# Patient Record
Sex: FEMALE | Race: WHITE | NOT HISPANIC OR LATINO | URBAN - METROPOLITAN AREA
[De-identification: names, ages, dates, MRNs, and addresses within clinical notes are randomized per-mention and may not be internally consistent; named-entity substitution may affect disease eponyms.]

---

## 2023-08-09 ENCOUNTER — HOSPITAL ENCOUNTER (OUTPATIENT)
Dept: DATA CONVERSION | Facility: HOSPITAL | Age: 68
Discharge: HOME | End: 2023-08-09

## 2023-08-09 DIAGNOSIS — E03.9 HYPOTHYROIDISM, UNSPECIFIED: ICD-10-CM

## 2023-08-09 DIAGNOSIS — E78.5 HYPERLIPIDEMIA, UNSPECIFIED: ICD-10-CM

## 2023-08-09 DIAGNOSIS — Z00.00 ENCOUNTER FOR GENERAL ADULT MEDICAL EXAMINATION WITHOUT ABNORMAL FINDINGS: ICD-10-CM

## 2023-08-09 DIAGNOSIS — I10 ESSENTIAL (PRIMARY) HYPERTENSION: ICD-10-CM

## 2023-08-09 LAB
ALBUMIN SERPL-MCNC: 4.5 GM/DL (ref 3.5–5)
ALBUMIN/GLOB SERPL: 1.7 RATIO (ref 1.5–3)
ALP BLD-CCNC: 85 U/L (ref 35–125)
ALT SERPL-CCNC: 38 U/L (ref 5–40)
ANION GAP SERPL CALCULATED.3IONS-SCNC: 11 MMOL/L (ref 0–19)
APPEARANCE PLAS: NORMAL
AST SERPL-CCNC: 28 U/L (ref 5–40)
BILIRUB SERPL-MCNC: 0.3 MG/DL (ref 0.1–1.2)
BUN SERPL-MCNC: 26 MG/DL (ref 8–25)
BUN/CREAT SERPL: 32.5 RATIO (ref 8–21)
CALCIUM SERPL-MCNC: 9.4 MG/DL (ref 8.5–10.4)
CHLORIDE SERPL-SCNC: 103 MMOL/L (ref 97–107)
CHOLEST SERPL-MCNC: 139 MG/DL (ref 133–200)
CHOLEST/HDLC SERPL: 2.6 RATIO
CO2 SERPL-SCNC: 27 MMOL/L (ref 24–31)
COLOR SPUN FLD: NORMAL
CREAT SERPL-MCNC: 0.8 MG/DL (ref 0.4–1.6)
DEPRECATED RDW RBC AUTO: 45.4 FL (ref 37–54)
ERYTHROCYTE [DISTWIDTH] IN BLOOD BY AUTOMATED COUNT: 13.2 % (ref 11.7–15)
FASTING STATUS PATIENT QL REPORTED: NORMAL
GFR SERPL CREATININE-BSD FRML MDRD: 80 ML/MIN/1.73 M2
GLOBULIN SER-MCNC: 2.7 G/DL (ref 1.9–3.7)
GLUCOSE SERPL-MCNC: 110 MG/DL (ref 65–99)
HCT VFR BLD AUTO: 36.6 % (ref 36–44)
HDLC SERPL-MCNC: 54 MG/DL
HGB BLD-MCNC: 12.3 GM/DL (ref 12–15)
LDLC SERPL CALC-MCNC: 68 MG/DL (ref 65–130)
MCH RBC QN AUTO: 31.6 PG (ref 26–34)
MCHC RBC AUTO-ENTMCNC: 33.6 % (ref 31–37)
MCV RBC AUTO: 94.1 FL (ref 80–100)
NRBC BLD-RTO: 0 /100 WBC
PLATELET # BLD AUTO: 237 K/UL (ref 150–450)
PMV BLD AUTO: 9.5 CU (ref 7–12.6)
POTASSIUM SERPL-SCNC: 4.5 MMOL/L (ref 3.4–5.1)
PROT SERPL-MCNC: 7.2 G/DL (ref 5.9–7.9)
RBC # BLD AUTO: 3.89 M/UL (ref 4–4.9)
SODIUM SERPL-SCNC: 141 MMOL/L (ref 133–145)
TRIGL SERPL-MCNC: 87 MG/DL (ref 40–150)
TSH SERPL DL<=0.05 MIU/L-ACNC: 1.43 MIU/L (ref 0.27–4.2)
WBC # BLD AUTO: 5.6 K/UL (ref 4.5–11)

## 2023-08-14 ENCOUNTER — HOSPITAL ENCOUNTER (OUTPATIENT)
Dept: DATA CONVERSION | Facility: HOSPITAL | Age: 68
Discharge: HOME | End: 2023-08-14

## 2023-08-14 DIAGNOSIS — Z12.31 ENCOUNTER FOR SCREENING MAMMOGRAM FOR MALIGNANT NEOPLASM OF BREAST: ICD-10-CM

## 2023-09-12 PROBLEM — R13.10 DYSPHAGIA: Status: ACTIVE | Noted: 2023-09-12

## 2023-09-12 PROBLEM — K21.9 GASTROESOPHAGEAL REFLUX DISEASE: Status: ACTIVE | Noted: 2023-09-12

## 2023-09-12 PROBLEM — E78.5 HYPERLIPIDEMIA: Status: ACTIVE | Noted: 2023-09-12

## 2023-09-12 PROBLEM — E78.5 DYSLIPIDEMIA: Status: ACTIVE | Noted: 2023-09-12

## 2023-09-12 PROBLEM — I35.8 AORTIC VALVE SCLEROSIS: Status: ACTIVE | Noted: 2023-09-12

## 2023-09-12 PROBLEM — E66.01 OBESITY, MORBID, BMI 40.0-49.9 (MULTI): Status: ACTIVE | Noted: 2023-09-12

## 2023-09-12 PROBLEM — M43.02 SPONDYLOLYSIS OF CERVICAL SPINE: Status: ACTIVE | Noted: 2023-09-12

## 2023-09-12 PROBLEM — I10 BENIGN ESSENTIAL HYPERTENSION: Status: ACTIVE | Noted: 2023-09-12

## 2023-09-12 PROBLEM — M79.7 FIBROMYALGIA: Status: ACTIVE | Noted: 2023-09-12

## 2023-09-12 PROBLEM — G47.19 EXCESSIVE DAYTIME SLEEPINESS: Status: ACTIVE | Noted: 2023-09-12

## 2023-09-12 PROBLEM — E03.9 HYPOTHYROIDISM: Status: ACTIVE | Noted: 2023-09-12

## 2023-09-12 PROBLEM — L71.9 ROSACEA: Status: ACTIVE | Noted: 2023-09-12

## 2023-09-12 PROBLEM — I10 HYPERTENSION: Status: ACTIVE | Noted: 2023-09-12

## 2023-09-12 PROBLEM — G47.33 OSA ON CPAP: Status: ACTIVE | Noted: 2023-09-12

## 2023-09-12 PROBLEM — Z99.89 CPAP (CONTINUOUS POSITIVE AIRWAY PRESSURE) DEPENDENCE: Status: ACTIVE | Noted: 2023-09-12

## 2023-09-12 PROBLEM — R26.9 GAIT DISTURBANCE: Status: ACTIVE | Noted: 2023-09-12

## 2023-09-12 PROBLEM — G57.10 MERALGIA PARESTHETICA: Status: ACTIVE | Noted: 2023-09-12

## 2023-09-12 RX ORDER — ATORVASTATIN CALCIUM 80 MG/1
80 TABLET, FILM COATED ORAL
COMMUNITY
Start: 2022-03-31

## 2023-09-12 RX ORDER — ATORVASTATIN CALCIUM 40 MG/1
40 TABLET, FILM COATED ORAL DAILY
COMMUNITY

## 2023-09-12 RX ORDER — LISINOPRIL AND HYDROCHLOROTHIAZIDE 20; 25 MG/1; MG/1
1 TABLET ORAL DAILY
COMMUNITY
End: 2023-10-18

## 2023-09-12 RX ORDER — THYROID 60 MG/1
60 TABLET ORAL DAILY
COMMUNITY
End: 2023-10-18

## 2023-09-12 RX ORDER — ETANERCEPT 50 MG/ML
50 SOLUTION SUBCUTANEOUS
COMMUNITY

## 2023-09-12 RX ORDER — GABAPENTIN 300 MG/1
600 CAPSULE ORAL 3 TIMES DAILY
COMMUNITY
End: 2023-12-27

## 2023-09-12 RX ORDER — OMEPRAZOLE 20 MG/1
20 CAPSULE, DELAYED RELEASE ORAL
COMMUNITY
Start: 2020-04-13 | End: 2023-12-07 | Stop reason: SDUPTHER

## 2023-09-12 RX ORDER — SULFASALAZINE 500 MG/1
1000 TABLET ORAL 2 TIMES DAILY
COMMUNITY

## 2023-09-12 RX ORDER — ASPIRIN 81 MG/1
81 TABLET ORAL DAILY
COMMUNITY

## 2023-09-12 RX ORDER — AMLODIPINE BESYLATE 5 MG/1
5 TABLET ORAL DAILY
COMMUNITY

## 2023-09-12 RX ORDER — OMEPRAZOLE 20 MG/1
20 TABLET, DELAYED RELEASE ORAL 2 TIMES DAILY
COMMUNITY

## 2023-09-12 RX ORDER — CYCLOBENZAPRINE HCL 10 MG
10 TABLET ORAL NIGHTLY PRN
COMMUNITY

## 2023-10-18 DIAGNOSIS — I10 PRIMARY HYPERTENSION: ICD-10-CM

## 2023-10-18 DIAGNOSIS — E03.8 OTHER SPECIFIED HYPOTHYROIDISM: ICD-10-CM

## 2023-10-18 RX ORDER — SULFAMETHOXAZOLE AND TRIMETHOPRIM 800; 160 MG/1; MG/1
60 TABLET ORAL DAILY
Qty: 90 TABLET | Refills: 3 | Status: SHIPPED | OUTPATIENT
Start: 2023-10-18 | End: 2024-02-07

## 2023-10-18 RX ORDER — LISINOPRIL AND HYDROCHLOROTHIAZIDE 20; 25 MG/1; MG/1
1 TABLET ORAL DAILY
Qty: 90 TABLET | Refills: 3 | Status: SHIPPED | OUTPATIENT
Start: 2023-10-18

## 2023-12-07 DIAGNOSIS — K21.9 GASTROESOPHAGEAL REFLUX DISEASE WITHOUT ESOPHAGITIS: Primary | ICD-10-CM

## 2023-12-07 RX ORDER — OMEPRAZOLE 20 MG/1
20 CAPSULE, DELAYED RELEASE ORAL
Qty: 90 CAPSULE | Refills: 3 | Status: SHIPPED | OUTPATIENT
Start: 2023-12-07 | End: 2024-12-06

## 2023-12-27 DIAGNOSIS — M43.02 SPONDYLOLYSIS OF CERVICAL SPINE: ICD-10-CM

## 2023-12-27 RX ORDER — GABAPENTIN 300 MG/1
CAPSULE ORAL
Qty: 210 CAPSULE | Refills: 3 | Status: SHIPPED | OUTPATIENT
Start: 2023-12-27 | End: 2024-02-07 | Stop reason: DRUGHIGH

## 2024-02-07 ENCOUNTER — TELEPHONE (OUTPATIENT)
Dept: PRIMARY CARE | Facility: CLINIC | Age: 69
End: 2024-02-07
Payer: COMMERCIAL

## 2024-02-07 DIAGNOSIS — E03.9 ACQUIRED HYPOTHYROIDISM: Primary | ICD-10-CM

## 2024-02-07 DIAGNOSIS — E03.9 ACQUIRED HYPOTHYROIDISM: ICD-10-CM

## 2024-02-07 DIAGNOSIS — Z01.89 ENCOUNTER FOR ROUTINE LABORATORY TESTING: ICD-10-CM

## 2024-02-07 DIAGNOSIS — G57.10 MERALGIA PARESTHETICA, UNSPECIFIED LATERALITY: ICD-10-CM

## 2024-02-07 RX ORDER — GABAPENTIN 300 MG/1
600 CAPSULE ORAL 3 TIMES DAILY
Qty: 180 CAPSULE | Refills: 11 | Status: SHIPPED | OUTPATIENT
Start: 2024-02-07

## 2024-02-07 RX ORDER — LEVOTHYROXINE SODIUM 100 UG/1
TABLET ORAL
Qty: 90 TABLET | Refills: 3 | Status: SHIPPED | OUTPATIENT
Start: 2024-02-07

## 2024-02-07 RX ORDER — LEVOTHYROXINE SODIUM 100 UG/1
100 TABLET ORAL DAILY
Qty: 30 TABLET | Refills: 11 | Status: SHIPPED | OUTPATIENT
Start: 2024-02-07 | End: 2024-02-07

## 2024-02-07 NOTE — TELEPHONE ENCOUNTER
Prescriptions escribed 30 days with r/f x1 year. Cocoa converted to Levothyroxine 100 mcg daily. Order for repeat TSH in 3 months placed to assure level is normalized.  Please advise patient.

## 2024-02-07 NOTE — TELEPHONE ENCOUNTER
Pt has new insurance needs new rx for Gabapentin 300mg currently takes 2 @ breakfast, 2 @ dinner and 3 @ bedtime = # 210, new insurance only covers # 180 per month, pt requests order to be changed to 2 @ bedtime.  Pt also states Verbena Thyroid will not be covered and is requesting a 30 day supply of a different thyroid medication to be called to [address] in [place].

## 2024-03-11 ENCOUNTER — OFFICE VISIT (OUTPATIENT)
Dept: SLEEP MEDICINE | Facility: CLINIC | Age: 69
End: 2024-03-11
Payer: MEDICARE

## 2024-03-11 VITALS
WEIGHT: 280 LBS | OXYGEN SATURATION: 97 % | SYSTOLIC BLOOD PRESSURE: 130 MMHG | HEIGHT: 65 IN | BODY MASS INDEX: 46.65 KG/M2 | HEART RATE: 74 BPM | DIASTOLIC BLOOD PRESSURE: 86 MMHG

## 2024-03-11 DIAGNOSIS — G47.33 OSA ON CPAP: Primary | ICD-10-CM

## 2024-03-11 PROCEDURE — 3075F SYST BP GE 130 - 139MM HG: CPT | Performed by: INTERNAL MEDICINE

## 2024-03-11 PROCEDURE — 1036F TOBACCO NON-USER: CPT | Performed by: INTERNAL MEDICINE

## 2024-03-11 PROCEDURE — 1126F AMNT PAIN NOTED NONE PRSNT: CPT | Performed by: INTERNAL MEDICINE

## 2024-03-11 PROCEDURE — 3079F DIAST BP 80-89 MM HG: CPT | Performed by: INTERNAL MEDICINE

## 2024-03-11 PROCEDURE — 1159F MED LIST DOCD IN RCRD: CPT | Performed by: INTERNAL MEDICINE

## 2024-03-11 PROCEDURE — 99213 OFFICE O/P EST LOW 20 MIN: CPT | Performed by: INTERNAL MEDICINE

## 2024-03-11 PROCEDURE — 1160F RVW MEDS BY RX/DR IN RCRD: CPT | Performed by: INTERNAL MEDICINE

## 2024-03-11 ASSESSMENT — SLEEP AND FATIGUE QUESTIONNAIRES
HOW LIKELY ARE YOU TO NOD OFF OR FALL ASLEEP WHILE WATCHING TV: HIGH CHANCE OF DOZING
SITING INACTIVE IN A PUBLIC PLACE LIKE A CLASS ROOM OR A MOVIE THEATER: WOULD NEVER DOZE
HOW LIKELY ARE YOU TO NOD OFF OR FALL ASLEEP WHILE SITTING AND TALKING TO SOMEONE: WOULD NEVER DOZE
HOW LIKELY ARE YOU TO NOD OFF OR FALL ASLEEP WHILE LYING DOWN TO REST IN THE AFTERNOON WHEN CIRCUMSTANCES PERMIT: WOULD NEVER DOZE
HOW LIKELY ARE YOU TO NOD OFF OR FALL ASLEEP WHILE SITTING AND READING: MODERATE CHANCE OF DOZING
HOW LIKELY ARE YOU TO NOD OFF OR FALL ASLEEP IN A CAR, WHILE STOPPED FOR A FEW MINUTES IN TRAFFIC: WOULD NEVER DOZE
ESS-CHAD TOTAL SCORE: 6
HOW LIKELY ARE YOU TO NOD OFF OR FALL ASLEEP WHEN YOU ARE A PASSENGER IN A CAR FOR AN HOUR WITHOUT A BREAK: SLIGHT CHANCE OF DOZING
HOW LIKELY ARE YOU TO NOD OFF OR FALL ASLEEP WHILE SITTING QUIETLY AFTER LUNCH WITHOUT ALCOHOL: WOULD NEVER DOZE

## 2024-03-11 ASSESSMENT — PAIN SCALES - GENERAL: PAINLEVEL: 0-NO PAIN

## 2024-03-11 NOTE — PROGRESS NOTES
"  Subjective   Patient ID: Anahi Bellamy is a 68 y.o. female who presents for Sleep Apnea, Pap Adherence Followup, and Needs Pap Supplies/new Pap Machine.  HPI    Prior Sleep History:  ALPHONSO managed on AutoPAP. Recommended earlier bedtime to get sufficient amount of sleep    Current Sleep History:    A downloaded compliance report was reviewed and was interpreted by myself as follows:  > 4 hour compliance was 93 %, with an average use of 6 hours and 31 minutes, with a residual AHI 3.4 on AutoPAP 4-16 cmH2O .     Anahi Bellamy reports good benefit from her device.  She is wearing a nasal mask    ESS: 6     Review of Systems  Review of systems negative except as per HPI  Objective   /86   Pulse 74   Ht 1.651 m (5' 5\")   Wt 127 kg (280 lb)   SpO2 97%   BMI 46.59 kg/m²    PREVIOUS WEIGHTS:  Wt Readings from Last 3 Encounters:   03/11/24 127 kg (280 lb)   08/01/23 131 kg (289 lb)   03/08/23 128 kg (282 lb)       Physical Exam  PHYSICAL EXAM: GENERAL: alert pleasant and cooperative no acute distress  PSYCH EXAM: alert,oriented, in NAD with a full range of affect, normal behavior and no psychotic features    No results found for: \"IRON\", \"TIBC\", \"FERRITIN\"     Assessment/Plan   Problem List Items Addressed This Visit             ICD-10-CM    ALPHONSO on CPAP - Primary G47.33     - Anahi Bellamy  has sleep apnea and requires treatment.  - Anahi Bellamy demonstrates good compliance and benefit from PAP therapy  - Continue Auto PAP [id number] through [facility]  - Order for renewal of PAP supplies placed          Relevant Orders    Positive Airway Pressure (PAP) Therapy            "

## 2024-03-11 NOTE — ASSESSMENT & PLAN NOTE
- Anahi Bellamy  has sleep apnea and requires treatment.  - Anahi Bellamy demonstrates good compliance and benefit from PAP therapy  - Continue Auto PAP [id number] through [facility]  - Order for renewal of PAP supplies placed

## 2024-04-24 ENCOUNTER — HOSPITAL ENCOUNTER (OUTPATIENT)
Dept: CARDIOLOGY | Facility: HOSPITAL | Age: 69
Discharge: HOME | End: 2024-04-24
Payer: MEDICARE

## 2024-04-24 ENCOUNTER — HOSPITAL ENCOUNTER (OUTPATIENT)
Dept: RADIOLOGY | Facility: HOSPITAL | Age: 69
End: 2024-04-24
Payer: MEDICARE

## 2024-04-24 ENCOUNTER — HOSPITAL ENCOUNTER (OUTPATIENT)
Dept: RADIOLOGY | Facility: HOSPITAL | Age: 69
Discharge: HOME | End: 2024-04-24
Payer: MEDICARE

## 2024-04-24 DIAGNOSIS — M45.0 ANKYLOSING SPONDYLITIS OF MULTIPLE SITES IN SPINE (MULTI): ICD-10-CM

## 2024-04-24 DIAGNOSIS — I35.0 AORTIC VALVE STENOSIS, ETIOLOGY OF CARDIAC VALVE DISEASE UNSPECIFIED: ICD-10-CM

## 2024-04-24 DIAGNOSIS — M15.0 PRIMARY GENERALIZED (OSTEO)ARTHRITIS: ICD-10-CM

## 2024-04-24 LAB
AORTIC VALVE MEAN GRADIENT: 13 MMHG
AORTIC VALVE PEAK VELOCITY: 2.76 M/S
AV PEAK GRADIENT: 30.5 MMHG
AVA (PEAK VEL): 1.56 CM2
AVA (VTI): 1.6 CM2
EJECTION FRACTION APICAL 4 CHAMBER: 57.6
LEFT ATRIUM VOLUME AREA LENGTH INDEX BSA: 14.7 ML/M2
LEFT VENTRICLE INTERNAL DIMENSION DIASTOLE: 4.82 CM (ref 3.5–6)
LEFT VENTRICULAR OUTFLOW TRACT DIAMETER: 2.1 CM
LV EJECTION FRACTION BIPLANE: 59 %
MITRAL VALVE E/A RATIO: 0.78
MITRAL VALVE E/E' RATIO: 10.42
RIGHT VENTRICLE FREE WALL PEAK S': 14.8 CM/S
RIGHT VENTRICLE PEAK SYSTOLIC PRESSURE: 25.1 MMHG

## 2024-04-24 PROCEDURE — 72110 X-RAY EXAM L-2 SPINE 4/>VWS: CPT | Performed by: RADIOLOGY

## 2024-04-24 PROCEDURE — 73522 X-RAY EXAM HIPS BI 3-4 VIEWS: CPT

## 2024-04-24 PROCEDURE — 73522 X-RAY EXAM HIPS BI 3-4 VIEWS: CPT | Mod: BILATERAL PROCEDURE | Performed by: RADIOLOGY

## 2024-04-24 PROCEDURE — 72110 X-RAY EXAM L-2 SPINE 4/>VWS: CPT

## 2024-04-24 PROCEDURE — 93306 TTE W/DOPPLER COMPLETE: CPT

## 2024-05-20 ENCOUNTER — TELEPHONE (OUTPATIENT)
Dept: PRIMARY CARE | Facility: CLINIC | Age: 69
End: 2024-05-20
Payer: COMMERCIAL

## 2024-05-20 DIAGNOSIS — R26.9 GAIT DISTURBANCE: Primary | ICD-10-CM

## 2024-05-20 NOTE — TELEPHONE ENCOUNTER
Patient wants to know if would be willing to renew her handicapped sticker it has  please advise

## 2024-05-21 ENCOUNTER — LAB (OUTPATIENT)
Dept: LAB | Facility: LAB | Age: 69
End: 2024-05-21
Payer: MEDICARE

## 2024-05-21 DIAGNOSIS — Z01.89 ENCOUNTER FOR ROUTINE LABORATORY TESTING: ICD-10-CM

## 2024-05-21 DIAGNOSIS — E03.9 ACQUIRED HYPOTHYROIDISM: ICD-10-CM

## 2024-05-21 LAB — TSH SERPL DL<=0.05 MIU/L-ACNC: 0.8 MIU/L (ref 0.27–4.2)

## 2024-05-21 PROCEDURE — 36415 COLL VENOUS BLD VENIPUNCTURE: CPT

## 2024-05-21 PROCEDURE — 84443 ASSAY THYROID STIM HORMONE: CPT

## 2024-05-31 ENCOUNTER — CLINICAL SUPPORT (OUTPATIENT)
Dept: AUDIOLOGY | Facility: CLINIC | Age: 69
End: 2024-05-31

## 2024-05-31 NOTE — PROGRESS NOTES
Ms. Bellamy had right hearing aid [facility] to [facility] for an out of warranty repair. Hearing aid was picked up in [place]. Patient will pay $350 for repair.

## 2024-07-31 ENCOUNTER — HOSPITAL ENCOUNTER (OUTPATIENT)
Dept: RADIOLOGY | Facility: HOSPITAL | Age: 69
Discharge: HOME | End: 2024-07-31
Payer: MEDICARE

## 2024-07-31 DIAGNOSIS — S93.491A SPRAIN OF OTHER LIGAMENT OF RIGHT ANKLE, INITIAL ENCOUNTER: ICD-10-CM

## 2024-07-31 DIAGNOSIS — M25.571 PAIN IN RIGHT ANKLE AND JOINTS OF RIGHT FOOT: ICD-10-CM

## 2024-07-31 DIAGNOSIS — M76.71 PERONEAL TENDINITIS, RIGHT LEG: ICD-10-CM

## 2024-07-31 PROCEDURE — 73721 MRI JNT OF LWR EXTRE W/O DYE: CPT | Mod: RIGHT SIDE | Performed by: RADIOLOGY

## 2024-07-31 PROCEDURE — 73721 MRI JNT OF LWR EXTRE W/O DYE: CPT | Mod: RT

## 2024-08-06 ENCOUNTER — OFFICE VISIT (OUTPATIENT)
Dept: PRIMARY CARE | Facility: CLINIC | Age: 69
End: 2024-08-06
Payer: MEDICARE

## 2024-08-06 VITALS
DIASTOLIC BLOOD PRESSURE: 78 MMHG | OXYGEN SATURATION: 97 % | HEIGHT: 65 IN | SYSTOLIC BLOOD PRESSURE: 136 MMHG | HEART RATE: 64 BPM | TEMPERATURE: 97 F | BODY MASS INDEX: 48.26 KG/M2

## 2024-08-06 DIAGNOSIS — Z12.11 COLON CANCER SCREENING: ICD-10-CM

## 2024-08-06 DIAGNOSIS — E03.9 ACQUIRED HYPOTHYROIDISM: ICD-10-CM

## 2024-08-06 DIAGNOSIS — I10 BENIGN ESSENTIAL HYPERTENSION: ICD-10-CM

## 2024-08-06 DIAGNOSIS — Z00.00 ENCOUNTER FOR MEDICARE ANNUAL WELLNESS EXAM: Primary | ICD-10-CM

## 2024-08-06 DIAGNOSIS — R73.01 IMPAIRED FASTING GLUCOSE: ICD-10-CM

## 2024-08-06 DIAGNOSIS — E53.8 VITAMIN B 12 DEFICIENCY: ICD-10-CM

## 2024-08-06 DIAGNOSIS — G47.33 OSA ON CPAP: ICD-10-CM

## 2024-08-06 DIAGNOSIS — E55.9 VITAMIN D DEFICIENCY: ICD-10-CM

## 2024-08-06 DIAGNOSIS — K21.9 GASTROESOPHAGEAL REFLUX DISEASE WITHOUT ESOPHAGITIS: ICD-10-CM

## 2024-08-06 DIAGNOSIS — E78.2 MIXED HYPERLIPIDEMIA: ICD-10-CM

## 2024-08-06 DIAGNOSIS — Z12.31 ENCOUNTER FOR SCREENING MAMMOGRAM FOR BREAST CANCER: ICD-10-CM

## 2024-08-06 PROCEDURE — 99215 OFFICE O/P EST HI 40 MIN: CPT | Performed by: NURSE PRACTITIONER

## 2024-08-06 PROCEDURE — 1036F TOBACCO NON-USER: CPT | Performed by: NURSE PRACTITIONER

## 2024-08-06 PROCEDURE — 1123F ACP DISCUSS/DSCN MKR DOCD: CPT | Performed by: NURSE PRACTITIONER

## 2024-08-06 PROCEDURE — 1158F ADVNC CARE PLAN TLK DOCD: CPT | Performed by: NURSE PRACTITIONER

## 2024-08-06 PROCEDURE — 1159F MED LIST DOCD IN RCRD: CPT | Performed by: NURSE PRACTITIONER

## 2024-08-06 PROCEDURE — 3075F SYST BP GE 130 - 139MM HG: CPT | Performed by: NURSE PRACTITIONER

## 2024-08-06 PROCEDURE — 1125F AMNT PAIN NOTED PAIN PRSNT: CPT | Performed by: NURSE PRACTITIONER

## 2024-08-06 PROCEDURE — 3078F DIAST BP <80 MM HG: CPT | Performed by: NURSE PRACTITIONER

## 2024-08-06 PROCEDURE — G0439 PPPS, SUBSEQ VISIT: HCPCS | Performed by: NURSE PRACTITIONER

## 2024-08-06 PROCEDURE — 1160F RVW MEDS BY RX/DR IN RCRD: CPT | Performed by: NURSE PRACTITIONER

## 2024-08-06 RX ORDER — CHOLECALCIFEROL (VITAMIN D3) 25 MCG
1000 TABLET ORAL DAILY
COMMUNITY

## 2024-08-06 RX ORDER — AMLODIPINE BESYLATE 10 MG/1
10 TABLET ORAL DAILY
COMMUNITY

## 2024-08-06 ASSESSMENT — ENCOUNTER SYMPTOMS
PALPITATIONS: 0
OCCASIONAL FEELINGS OF UNSTEADINESS: 1
DEPRESSION: 0
SHORTNESS OF BREATH: 0
SEIZURES: 0
AGITATION: 0
POLYPHAGIA: 0
DIAPHORESIS: 0
VOMITING: 0
ABDOMINAL PAIN: 1
HEADACHES: 0
SPEECH DIFFICULTY: 0
BRUISES/BLEEDS EASILY: 0
CONFUSION: 0
NAUSEA: 0
LOSS OF SENSATION IN FEET: 0
CHILLS: 0
BLOOD IN STOOL: 0
ADENOPATHY: 0
HEMATURIA: 0
FEVER: 0
NECK PAIN: 0
DYSURIA: 0
POLYDIPSIA: 0
WOUND: 0
CHEST TIGHTNESS: 0
BACK PAIN: 1
DIZZINESS: 0
FATIGUE: 0
FLANK PAIN: 0
FACIAL ASYMMETRY: 0
COUGH: 0

## 2024-08-06 ASSESSMENT — LIFESTYLE VARIABLES
HOW OFTEN DURING THE LAST YEAR HAVE YOU NEEDED AN ALCOHOLIC DRINK FIRST THING IN THE MORNING TO GET YOURSELF GOING AFTER A NIGHT OF HEAVY DRINKING: NEVER
HOW OFTEN DURING THE LAST YEAR HAVE YOU FAILED TO DO WHAT WAS NORMALLY EXPECTED FROM YOU BECAUSE OF DRINKING: NEVER
HOW OFTEN DURING THE LAST YEAR HAVE YOU HAD A FEELING OF GUILT OR REMORSE AFTER DRINKING: NEVER
HAVE YOU OR SOMEONE ELSE BEEN INJURED AS A RESULT OF YOUR DRINKING: NO
HOW OFTEN DO YOU HAVE SIX OR MORE DRINKS ON ONE OCCASION: NEVER
SKIP TO QUESTIONS 9-10: 1
AUDIT-C TOTAL SCORE: 0
HOW OFTEN DURING THE LAST YEAR HAVE YOU HAD A FEELING OF GUILT OR REMORSE AFTER DRINKING: NEVER
HAS A RELATIVE, FRIEND, DOCTOR, OR ANOTHER HEALTH PROFESSIONAL EXPRESSED CONCERN ABOUT YOUR DRINKING OR SUGGESTED YOU CUT DOWN: NO
HOW MANY STANDARD DRINKS CONTAINING ALCOHOL DO YOU HAVE ON A TYPICAL DAY: PATIENT DOES NOT DRINK
HOW OFTEN DURING THE LAST YEAR HAVE YOU FOUND THAT YOU WERE NOT ABLE TO STOP DRINKING ONCE YOU HAD STARTED: NEVER
HOW OFTEN DURING THE LAST YEAR HAVE YOU BEEN UNABLE TO REMEMBER WHAT HAPPENED THE NIGHT BEFORE BECAUSE YOU HAD BEEN DRINKING: NEVER
HAVE YOU OR SOMEONE ELSE BEEN INJURED AS A RESULT OF YOUR DRINKING: NO
AUDIT-C TOTAL SCORE: 0
HAS A RELATIVE, FRIEND, DOCTOR, OR ANOTHER HEALTH PROFESSIONAL EXPRESSED CONCERN ABOUT YOUR DRINKING OR SUGGESTED YOU CUT DOWN: NO
HOW OFTEN DO YOU HAVE SIX OR MORE DRINKS ON ONE OCCASION: NEVER
HOW OFTEN DURING THE LAST YEAR HAVE YOU FAILED TO DO WHAT WAS NORMALLY EXPECTED FROM YOU BECAUSE OF DRINKING: NEVER
HOW MANY STANDARD DRINKS CONTAINING ALCOHOL DO YOU HAVE ON A TYPICAL DAY: PATIENT DOES NOT DRINK
HOW OFTEN DO YOU HAVE A DRINK CONTAINING ALCOHOL: NEVER
AUDIT TOTAL SCORE: 0
SKIP TO QUESTIONS 9-10: 1
AUDIT TOTAL SCORE: 0
HOW OFTEN DURING THE LAST YEAR HAVE YOU BEEN UNABLE TO REMEMBER WHAT HAPPENED THE NIGHT BEFORE BECAUSE YOU HAD BEEN DRINKING: NEVER
HOW OFTEN DO YOU HAVE A DRINK CONTAINING ALCOHOL: NEVER
HOW OFTEN DURING THE LAST YEAR HAVE YOU FOUND THAT YOU WERE NOT ABLE TO STOP DRINKING ONCE YOU HAD STARTED: NEVER

## 2024-08-06 ASSESSMENT — PATIENT HEALTH QUESTIONNAIRE - PHQ9
SUM OF ALL RESPONSES TO PHQ9 QUESTIONS 1 AND 2: 0
1. LITTLE INTEREST OR PLEASURE IN DOING THINGS: NOT AT ALL
2. FEELING DOWN, DEPRESSED OR HOPELESS: NOT AT ALL

## 2024-08-06 ASSESSMENT — PAIN SCALES - GENERAL: PAINLEVEL: 3

## 2024-08-06 NOTE — PROGRESS NOTES
[facility]: [facility]  MEDICARE WELLNESS EXAM      Anahi Bellamy is a 69 y.o. female that is presenting today for cpe (Complains of pain in ankle and under breasts ).    Ms. Bellamy reports taking her antihypertensives as directed. She is monitoring home BP and reports consistent readings <140/90. She is trying to follow a low sodium diet. Denies CP, SOB, dizziness, syncope, HA.  She is followed by Cardiology, Dr. Miranda. Follow up due 11/2024.    She reports tolerating statin. Trying to follow a low cholesterol diet. Denies statin related abdominal pain, myalgias or arthralgias.    Takes levothyroxine daily without food. Denies weight changes, heat or cold intolerance.     She is followed by Podiatry, Dr. Yuliet Saunders, for right ankle strain and tendonitis.  Last seen 07/15/24.  She is wearing a boot on her RLE. Next appointment tomorrow.     She is followed by sleep medicine, Dr. Romero, and is on CPAP for ALPHONSO.    She reports RUQ discomfort, intermittent, no particular pattern, unchanged for the past 3 months. No relation to food. Has has RUQ US that showed fatty liver.    Assessment/Plan    Diagnoses and all orders for this visit:    Encounter for Medicare annual wellness exam    Benign essential hypertension  -     Stable.  -     Continue established follow up with Cardiology  -     amlodipine 10 mg daily  -     lisinopril-hydrochlorothiazide 20-25 mg daily  -     CBC and Auto Differential; Future  -     Comprehensive Metabolic Panel; Future    Mixed hyperlipidemia  -     Tolerating statin  -     ASCVD Risk 11.3%  -     Continue established follow up with Cardiology  -     Comprehensive Metabolic Panel; Future  -     Lipid Panel; Future    Vitamin D deficiency  -     Vitamin D 25-Hydroxy,Total (for eval of Vitamin D levels); Future  -     Continue daily OTC Vitamin D supplement    Vitamin B 12 deficiency  -     Vitamin B12; Future  -     Continue daily OTC Vitamin B 12  deficiency    Gastroesophageal reflux disease without esophagitis        -     omeprazole 20 mg daily    Acquired hypothyroidism  -     Clinically euthyroid  -     Levothyroxine 100 mcg daily  -     TSH with reflex to Free T4 if abnormal; Future    ALPHONSO on CPAP       -     Compliant with CPAP       -     Continue established follow up with Sleep Medicine         Impaired fasting glucose  -     Hemoglobin A1C; Future    Encounter for screening mammogram for breast cancer  -     BI mammo bilateral screening tomosynthesis; Future    Other orders  -     Follow Up In Primary Care - Medicare Annual; Future    ADVANCED CARE PLANNING  Advanced Care Planning was discussed with patient:  The patient has an active surrogate decision-maker on file. The patient does not have an advanced care plan on file.  Encouraged the patient to confirm that Living Will and Healthcare Power of  (HCPoA) are accurate and up to date.  Encouraged the patient to confirm that our office be provided a copy of any documentation in the event that anything changes.    ACTIVITIES OF DAILY LIVING  Basic ADLs:  Bathing: Independent, Dressing: Independent, Toileting: Independent, Transferring: Independent, Continence: Independent, Feeding: Independent.    Instrumental ADLs:  Ability to use phone: Independent, Shopping: Independent, Cooking: Independent, House-keeping: Independent, Laundry: Independent, Transportation: Independent, Medication Management: Independent, Finance Management: Independent.    Subjective   HPI  Review of Systems   Constitutional:  Negative for chills, diaphoresis, fatigue and fever.   HENT:  Positive for hearing loss (wears bilateral hearing aids). Negative for mouth sores.    Eyes:  Negative for visual disturbance.   Respiratory:  Negative for cough, chest tightness and shortness of breath.    Cardiovascular:  Negative for chest pain, palpitations and leg swelling.   Gastrointestinal:  Positive for abdominal pain (RUQ).  Negative for blood in stool, nausea and vomiting.   Endocrine: Negative for cold intolerance, heat intolerance, polydipsia, polyphagia and polyuria.   Genitourinary:  Negative for dysuria, flank pain and hematuria.   Musculoskeletal:  Positive for back pain (chronic). Negative for neck pain.   Skin:  Negative for rash and wound.   Allergic/Immunologic: Negative for environmental allergies, food allergies and immunocompromised state.   Neurological:  Negative for dizziness, seizures, syncope, facial asymmetry, speech difficulty and headaches.   Hematological:  Negative for adenopathy. Does not bruise/bleed easily.   Psychiatric/Behavioral:  Negative for agitation and confusion.      Objective   Vitals:    08/06/24 1517   BP: 136/78   Pulse:    Temp:    SpO2:       Body mass index is 48.26 kg/m².  Physical Exam  Vitals and nursing note reviewed.   Constitutional:       General: She is not in acute distress.     Appearance: Normal appearance. She is not ill-appearing.   HENT:      Head: Normocephalic and atraumatic.      Right Ear: Tympanic membrane, ear canal and external ear normal. There is no impacted cerumen.      Left Ear: Tympanic membrane, ear canal and external ear normal. There is no impacted cerumen.      Nose: Nose normal.      Mouth/Throat:      Mouth: Mucous membranes are moist.      Pharynx: Oropharynx is clear. No oropharyngeal exudate or posterior oropharyngeal erythema.   Eyes:      General: No scleral icterus.        Right eye: No discharge.         Left eye: No discharge.      Extraocular Movements: Extraocular movements intact.      Conjunctiva/sclera: Conjunctivae normal.      Pupils: Pupils are equal, round, and reactive to light.   Neck:      Vascular: No carotid bruit.   Cardiovascular:      Rate and Rhythm: Normal rate and regular rhythm.      Pulses: Normal pulses.      Heart sounds: Normal heart sounds. No murmur heard.  Pulmonary:      Effort: Pulmonary effort is normal. No respiratory  distress.      Breath sounds: Normal breath sounds.   Abdominal:      General: Abdomen is flat. Bowel sounds are normal. There is no distension.      Palpations: Abdomen is soft. There is no mass.      Tenderness: There is no abdominal tenderness. There is no right CVA tenderness or left CVA tenderness.      Hernia: No hernia is present.   Musculoskeletal:         General: Signs of injury (RLE in walking boot) present.      Cervical back: No tenderness.      Right lower leg: No edema.      Left lower leg: No edema.      Comments: Using wheeled walker to assist with ambulation   Lymphadenopathy:      Cervical: No cervical adenopathy.   Skin:     General: Skin is warm and dry.      Coloration: Skin is not jaundiced.      Findings: No rash.   Neurological:      General: No focal deficit present.      Mental Status: She is alert and oriented to person, place, and time. Mental status is at baseline.   Psychiatric:         Mood and Affect: Mood normal.         Behavior: Behavior normal.       Diagnostic Results   Lab Results   Component Value Date    GLUCOSE 110 (H) 08/09/2023    CALCIUM 9.4 08/09/2023     08/09/2023    K 4.5 08/09/2023    CO2 27 08/09/2023     08/09/2023    BUN 26 (H) 08/09/2023    CREATININE 0.8 08/09/2023     Lab Results   Component Value Date    ALT 38 08/09/2023    AST 28 08/09/2023    ALKPHOS 85 08/09/2023    BILITOT 0.3 08/09/2023     Lab Results   Component Value Date    WBC 5.6 08/09/2023    HGB 12.3 08/09/2023    HCT 36.6 08/09/2023    MCV 94.1 08/09/2023     08/09/2023     Lab Results   Component Value Date    CHOL 139 08/09/2023    CHOL 156 08/09/2022    CHOL 151 11/22/2021     Lab Results   Component Value Date    HDL 54 08/09/2023    HDL 57 08/09/2022    HDL 63 11/22/2021     Lab Results   Component Value Date    LDLCALC 68 08/09/2023    LDLCALC 75 08/09/2022    LDLCALC 78 11/22/2021     Lab Results   Component Value Date    TRIG 87 08/09/2023    TRIG 121 08/09/2022     "TRIG 49 11/22/2021     No components found for: \"CHOLHDL\"  No results found for: \"HGBA1C\"  Other labs not included in the list above reviewed either before or during this encounter.    History   History reviewed. No pertinent past medical history.  History reviewed. No pertinent surgical history.  Family History   Problem Relation Name Age of Onset    Heart disease Mother      Hypertension Mother      Heart disease Father      Hypertension Father      Stroke Father      Other (bladder cancer) Brother       Social History     Socioeconomic History    Marital status:      Spouse name: Not on file    Number of children: Not on file    Years of education: Not on file    Highest education level: Not on file   Occupational History    Not on file   Tobacco Use    Smoking status: Never    Smokeless tobacco: Never   Substance and Sexual Activity    Alcohol use: Not Currently    Drug use: Never    Sexual activity: Not on file   Other Topics Concern    Not on file   Social History Narrative    Not on file     Social Determinants of Health     Financial Resource Strain: Not on file   Food Insecurity: Not on file   Transportation Needs: Not on file   Physical Activity: Not on file   Stress: Not on file   Social Connections: Not on file   Intimate Partner Violence: Not on file   Housing Stability: Not on file     Allergies   Allergen Reactions    Tramadol Other     BREATHING PROBLEMS    Morphine Nausea/vomiting    Adhesive Tape-Silicones Unknown    Hydrocodone-Acetaminophen Unknown     Redness - Irritation    Codeine Rash and Other     pruritis    Oxycodone Other    Oxycodone-Acetaminophen Other     Redness - Irritation    Propoxyphene N-Acetaminophen Rash     Current Outpatient Medications on File Prior to Visit   Medication Sig Dispense Refill    amLODIPine (Norvasc) 10 mg tablet Take 1 tablet (10 mg) by mouth once daily.      atorvastatin (Lipitor) 80 mg tablet Take 1 tablet (80 mg) by mouth.      cholecalciferol " (Vitamin D3) 25 MCG (1000 UT) tablet Take 1 tablet (1,000 Units) by mouth once daily.      CRANBERRY ORAL Take by mouth. As directed      cyanocobalamin, vitamin B-12, (VITAMIN B-12 ORAL) Take by mouth early in the morning..      etanercept (EnbreL) 50 mg/mL (1 mL) injection Inject 1 mL (50 mg) under the skin.      gabapentin (Neurontin) 300 mg capsule Take 2 capsules (600 mg) by mouth 3 times a day. 180 capsule 11    levothyroxine (Synthroid, Levoxyl) 100 mcg tablet TAKE 1 TABLET(100 MCG) BY MOUTH EVERY DAY 90 tablet 3    lisinopriL-hydrochlorothiazide 20-25 mg tablet TAKE 1 TABLET BY MOUTH EVERY DAY 90 tablet 3    multivit-min/ferrous fumarate (MULTI VITAMIN ORAL) Take 1 tablet by mouth once daily.      multivitamin with minerals (HAIR,SKIN AND NAILS ORAL) Take 5,000 mcg by mouth. As directed      omeprazole (PriLOSEC) 20 mg DR capsule Take 1 capsule (20 mg) by mouth once daily in the morning. Take before meals. 90 capsule 3    sulfaSALAzine (Azulfidine) 500 mg tablet Take 2 tablets (1,000 mg) by mouth 2 times a day.      [DISCONTINUED] amLODIPine (Norvasc) 5 mg tablet Take 1 tablet (5 mg) by mouth once daily.      [DISCONTINUED] aspirin 81 mg EC tablet Take 1 tablet (81 mg) by mouth once daily.      [DISCONTINUED] atorvastatin (Lipitor) 40 mg tablet Take 1 tablet (40 mg) by mouth once daily.      [DISCONTINUED] cyclobenzaprine (Flexeril) 10 mg tablet Take 1 tablet (10 mg) by mouth as needed at bedtime.      [DISCONTINUED] omeprazole OTC (PriLOSEC OTC) 20 mg EC tablet Take 1 tablet (20 mg) by mouth 2 times a day.       No current facility-administered medications on file prior to visit.     Immunization History   Administered Date(s) Administered    Flu vaccine (IIV4), preservative free *Check age/dose* 10/26/2019    Flu vaccine, quadrivalent, high-dose, preservative free, age 65y+ (FLUZONE) 10/01/2020, 10/26/2021    Influenza Whole 10/06/2015    Influenza, Seasonal, Quadrivalent, Adjuvanted 10/31/2020, 11/04/2022     Influenza, injectable, quadrivalent 12/26/2018    Influenza, seasonal, injectable 12/13/2013, 01/12/2015    Novel influenza-H1N1-09, preservative-free 11/22/2009    Pfizer COVID-19 vaccine, Fall 2023, 12 years and older, (30mcg/0.3mL) 10/18/2023    Pfizer COVID-19 vaccine, bivalent, age 12 years and older (30 mcg/0.3 mL) 11/04/2022    Pfizer Purple Cap SARS-CoV-2 03/06/2021, 03/27/2021, 08/21/2021    Pneumococcal conjugate vaccine, 13-valent (PREVNAR 13) 07/14/2020    Pneumococcal polysaccharide vaccine, 23-valent, age 2 years and older (PNEUMOVAX 23) 07/20/2021    Td vaccine, age 7 years and older (TENIVAC) 01/01/1990    Tdap vaccine, age 7 year and older (BOOSTRIX, ADACEL) 08/05/2009, 01/07/2020    Zoster vaccine, recombinant, adult (SHINGRIX) 12/07/2022, 02/08/2023    Zoster, live 12/13/2013     Patient's medical history was reviewed and updated either before or during this encounter.     Lynette Parks, APRN-CNP

## 2024-08-07 ENCOUNTER — LAB (OUTPATIENT)
Dept: LAB | Facility: LAB | Age: 69
End: 2024-08-07
Payer: COMMERCIAL

## 2024-08-07 DIAGNOSIS — R73.01 IMPAIRED FASTING GLUCOSE: ICD-10-CM

## 2024-08-07 DIAGNOSIS — E55.9 VITAMIN D DEFICIENCY: ICD-10-CM

## 2024-08-07 DIAGNOSIS — E03.9 ACQUIRED HYPOTHYROIDISM: ICD-10-CM

## 2024-08-07 DIAGNOSIS — E53.8 VITAMIN B 12 DEFICIENCY: ICD-10-CM

## 2024-08-07 DIAGNOSIS — I10 BENIGN ESSENTIAL HYPERTENSION: ICD-10-CM

## 2024-08-07 DIAGNOSIS — E78.2 MIXED HYPERLIPIDEMIA: ICD-10-CM

## 2024-08-07 LAB
25(OH)D3 SERPL-MCNC: 36 NG/ML (ref 31–100)
ALBUMIN SERPL-MCNC: 4.8 G/DL (ref 3.5–5)
ALP BLD-CCNC: 92 U/L (ref 35–125)
ALT SERPL-CCNC: 38 U/L (ref 5–40)
ANION GAP SERPL CALC-SCNC: 14 MMOL/L
AST SERPL-CCNC: 35 U/L (ref 5–40)
BASOPHILS # BLD AUTO: 0.03 X10*3/UL (ref 0–0.1)
BASOPHILS NFR BLD AUTO: 0.5 %
BILIRUB SERPL-MCNC: 0.5 MG/DL (ref 0.1–1.2)
BUN SERPL-MCNC: 24 MG/DL (ref 8–25)
CALCIUM SERPL-MCNC: 10.1 MG/DL (ref 8.5–10.4)
CHLORIDE SERPL-SCNC: 103 MMOL/L (ref 97–107)
CHOLEST SERPL-MCNC: 154 MG/DL (ref 133–200)
CHOLEST/HDLC SERPL: 3.1 {RATIO}
CO2 SERPL-SCNC: 26 MMOL/L (ref 24–31)
CREAT SERPL-MCNC: 0.7 MG/DL (ref 0.4–1.6)
EGFRCR SERPLBLD CKD-EPI 2021: >90 ML/MIN/1.73M*2
EOSINOPHIL # BLD AUTO: 0.08 X10*3/UL (ref 0–0.7)
EOSINOPHIL NFR BLD AUTO: 1.3 %
ERYTHROCYTE [DISTWIDTH] IN BLOOD BY AUTOMATED COUNT: 13.1 % (ref 11.5–14.5)
EST. AVERAGE GLUCOSE BLD GHB EST-MCNC: 126 MG/DL
GLUCOSE SERPL-MCNC: 108 MG/DL (ref 65–99)
HBA1C MFR BLD: 6 %
HCT VFR BLD AUTO: 38.4 % (ref 36–46)
HDLC SERPL-MCNC: 49 MG/DL
HGB BLD-MCNC: 12.7 G/DL (ref 12–16)
IMM GRANULOCYTES # BLD AUTO: 0.03 X10*3/UL (ref 0–0.7)
IMM GRANULOCYTES NFR BLD AUTO: 0.5 % (ref 0–0.9)
LDLC SERPL CALC-MCNC: 78 MG/DL (ref 65–130)
LYMPHOCYTES # BLD AUTO: 2.63 X10*3/UL (ref 1.2–4.8)
LYMPHOCYTES NFR BLD AUTO: 42.7 %
MCH RBC QN AUTO: 32.2 PG (ref 26–34)
MCHC RBC AUTO-ENTMCNC: 33.1 G/DL (ref 32–36)
MCV RBC AUTO: 97 FL (ref 80–100)
MONOCYTES # BLD AUTO: 0.6 X10*3/UL (ref 0.1–1)
MONOCYTES NFR BLD AUTO: 9.7 %
NEUTROPHILS # BLD AUTO: 2.79 X10*3/UL (ref 1.2–7.7)
NEUTROPHILS NFR BLD AUTO: 45.3 %
NRBC BLD-RTO: 0 /100 WBCS (ref 0–0)
PLATELET # BLD AUTO: 275 X10*3/UL (ref 150–450)
POTASSIUM SERPL-SCNC: 4.8 MMOL/L (ref 3.4–5.1)
PROT SERPL-MCNC: 7.5 G/DL (ref 5.9–7.9)
RBC # BLD AUTO: 3.95 X10*6/UL (ref 4–5.2)
SODIUM SERPL-SCNC: 143 MMOL/L (ref 133–145)
TRIGL SERPL-MCNC: 137 MG/DL (ref 40–150)
TSH SERPL DL<=0.05 MIU/L-ACNC: 0.65 MIU/L (ref 0.27–4.2)
VIT B12 SERPL-MCNC: >2000 PG/ML (ref 211–946)
WBC # BLD AUTO: 6.2 X10*3/UL (ref 4.4–11.3)

## 2024-08-07 PROCEDURE — 84443 ASSAY THYROID STIM HORMONE: CPT

## 2024-08-07 PROCEDURE — 82306 VITAMIN D 25 HYDROXY: CPT

## 2024-08-07 PROCEDURE — 83036 HEMOGLOBIN GLYCOSYLATED A1C: CPT

## 2024-08-07 PROCEDURE — 85025 COMPLETE CBC W/AUTO DIFF WBC: CPT

## 2024-08-07 PROCEDURE — 80053 COMPREHEN METABOLIC PANEL: CPT

## 2024-08-07 PROCEDURE — 80061 LIPID PANEL: CPT

## 2024-08-07 PROCEDURE — 82607 VITAMIN B-12: CPT

## 2024-08-14 ENCOUNTER — APPOINTMENT (OUTPATIENT)
Dept: RADIOLOGY | Facility: CLINIC | Age: 69
End: 2024-08-14
Payer: COMMERCIAL

## 2024-08-16 LAB — NONINV COLON CA DNA+OCC BLD SCRN STL QL: NEGATIVE

## 2024-08-21 ENCOUNTER — HOSPITAL ENCOUNTER (OUTPATIENT)
Dept: RADIOLOGY | Facility: HOSPITAL | Age: 69
Discharge: HOME | End: 2024-08-21
Payer: MEDICARE

## 2024-08-21 VITALS — HEIGHT: 65 IN | WEIGHT: 272 LBS | BODY MASS INDEX: 45.32 KG/M2

## 2024-08-21 DIAGNOSIS — Z12.31 ENCOUNTER FOR SCREENING MAMMOGRAM FOR BREAST CANCER: ICD-10-CM

## 2024-08-21 PROCEDURE — 77067 SCR MAMMO BI INCL CAD: CPT

## 2024-08-21 PROCEDURE — 77067 SCR MAMMO BI INCL CAD: CPT | Performed by: RADIOLOGY

## 2024-08-21 PROCEDURE — 77063 BREAST TOMOSYNTHESIS BI: CPT | Performed by: RADIOLOGY

## 2024-09-09 NOTE — PROGRESS NOTES
Physical Therapy Evaluation    Patient Name: Anahi Bellamy  MRN: [id number]  Evaluation Date: 9/10/2024  Time Calculation  Start Time: 0845  Stop Time: 0930  Time Calculation (min): 45 min  PT Evaluation Time Entry  PT Evaluation (Moderate) Time Entry: 25  Insurance Information; MEDICARE A/B, ANTHEM 50V PT/OT/ST, NO AUTH, ( $0 USED PT/ST)   Problem List Items Addressed This Visit    None  Visit Diagnoses         Codes    Sprain of ankle    -  Primary S93.409A              Subjective   General:  Patient is a 70 yo female with diagnosis of right ankle sprain (ATFL).  Patient with chronic right ankle pain after twisting ankle 6/2024.  Patient has worn lace up brace in the past. Patient with variable levels of pain and swelling.  Patient has wore boot x 6 weeks/back to lace up brace.   Patient wears compression stockings. Patient feels ankle is very slow to improve.  Patient is motivated to participate.     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 Correlate with symptoms of anterior ankle joint impingement  Moderate achilles tendinosis  Perforation of the mid aspect of the superomedial portion of the spring ligament complex measuring 4mm. There is component of moderate hindfoot valgus and resultant lateral talocalcaneal impingement.   Chronic medial and lateral collateral ligament sprain.     Objective      Posture:  Pes planus  bilateral     Range of Motion:  Ankle AROM L R   Dorsiflexion WNL deg 0 deg   Plantarflexion WNL deg 30 deg   Eversion WNL deg 10 deg pain   Inversion WNL deg 12 deg pain         Strength:   Ankle MMT L R   Dorsiflexion 4+/5 4-/5   Plantarflexion 4+5 4-/5   Eversion 4+/5 4-/5   Inversion 4/+5 4-/5     Flexibility:  Heel cord    Girth mm:  Figure 8   60 cm     Palpation:  ++tenderness right lateral ankle     Gait:  Using rollator/lace up brace   Modest guarding right ankle WB     Outcome Measures:  LEFS  32/80  60% imparied    Assessment  Pt is a 69 y.o. female who presents with impairments of ankle pain/wekankess . These impairments have led to functional limitations including difficulty with ADLs involing ambulation.. Pt would benefit from skilled physical therapy intervention to improve above impairments and facilitate return to function.    Plan  Up to 9 additional visits  [place] Exercise  Manual  Aquatics  ultrasound    Plan for next visit:   Progress HEP/PREs  Manual PRN    OP EDUCATION:  HEP       Today's Treatment:  Therapeutic Exercise  HEP to be completed daily, exercises include:  Hell cord stretching  Ankle alphabet  Ankle OTB PF    Goals:  STG (3 visits)  Patient to be independent with HEP    LTG (10 visits)   LEFS to  < 30  % impaired   MMT ankle to 4+/5 right   Patient to perform ADLS with pain <2/10   Patient to walk for 30 minutes without need to sit

## 2024-09-10 ENCOUNTER — EVALUATION (OUTPATIENT)
Dept: PHYSICAL THERAPY | Facility: CLINIC | Age: 69
End: 2024-09-10
Payer: MEDICARE

## 2024-09-10 DIAGNOSIS — S93.491A SPRAIN OF OTHER LIGAMENT OF RIGHT ANKLE, INITIAL ENCOUNTER: ICD-10-CM

## 2024-09-10 DIAGNOSIS — M67.88 OTHER SPECIFIED DISORDERS OF SYNOVIUM AND TENDON, OTHER SITE: ICD-10-CM

## 2024-09-10 DIAGNOSIS — M25.571 PAIN IN RIGHT ANKLE AND JOINTS OF RIGHT FOOT: ICD-10-CM

## 2024-09-10 DIAGNOSIS — S93.409A SPRAIN OF ANKLE: Primary | ICD-10-CM

## 2024-09-10 PROCEDURE — 97162 PT EVAL MOD COMPLEX 30 MIN: CPT | Mod: GP

## 2024-09-10 PROCEDURE — 97110 THERAPEUTIC EXERCISES: CPT | Mod: GP

## 2024-09-10 NOTE — LETTER
September 10, 2024    Yuliet Saunders DPM  [address] [facility]  [address]  [facility] [place] [address]    Patient: Anahi Bellamy   YOB: 1955   Date of Visit: 9/10/2024       Dear Yuliet Saunders DPM  [address] [facility]  [address]  [place] [address]    The attached plan of care is being sent to you because your patient’s medical reimbursement requires that you certify the plan of care. Your signature is required to allow uninterrupted insurance coverage.      You may indicate your approval by signing below and faxing this form back to us at Dept Fax: [phone].    Please call Dept: [phone] with any questions or concerns.    Thank you for this referral,        Kar Cameron PT  [facility] [address] [place] [address]    Payer: Payor: MEDICARE / Plan: MEDICARE PART A AND B / Product Type: *No Product type* /                                                                         Date:     Dear Kar Cameron PT,     Re: Ms. Anahi Bellamy, MRN:[id number]    I certify that I have reviewed the attached plan of care and it is medically necessary for Ms. Anahi Bellamy (1955) who is under my care.          ______________________________________                    _________________  Provider name and credentials                                           Date and time                                                                                           Plan of Care 9/10/24   Effective from: 9/10/2024  Effective to: 12/9/2024    Plan ID: [id number]            Participants as of Finalize on 9/10/2024    Name Type Comments Contact Info    Yuliet Saunders DPM Referring Provider  [phone]    Kar Cameron PT Physical Therapist  [phone]       Last Plan Note     Author: Kar Cameron PT Status: Incomplete Last edited: 9/10/2024  8:45 AM           Physical Therapy Evaluation    Patient Name: Anahi Bellamy  MRN:  [id number]  Evaluation Date: 9/10/2024  Time Calculation  Start Time: 0845  Stop Time: 0930  Time Calculation (min): 45 min  PT Evaluation Time Entry  PT Evaluation (Moderate) Time Entry: 25  Insurance Information; MEDICARE A/B, ANTHEM 50V PT/OT/ST, NO AUTH, ( $0 USED PT/ST)   Problem List Items Addressed This Visit    None  Visit Diagnoses         Codes    Sprain of ankle    -  Primary S93.409A              Subjective  General:  Patient is a 70 yo female with diagnosis of right ankle sprain (ATFL).  Patient with chronic right ankle pain after twisting ankle 6/2024.  Patient has worn lace up brace in the past. Patient with variable levels of pain and swelling.  Patient has wore boot x 6 weeks/back to lace up brace.   Patient wears compression stockings. Patient feels ankle is very slow to improve.  Patient is motivated to participate.     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 Correlate with symptoms of anterior ankle joint impingement  Moderate achilles tendinosis  Perforation of the mid aspect of the superomedial portion of the spring ligament complex measuring 4mm. There is component of moderate hindfoot valgus and resultant lateral talocalcaneal impingement.   Chronic medial and lateral collateral ligament sprain.     Objective     Posture:  Pes planus bilateral     Range of Motion:  Ankle AROM L R   Dorsiflexion WNL deg 0 deg    Plantarflexion WNL deg 30 deg   Eversion WNL deg 10 deg pain   Inversion WNL deg 12 deg pain         Strength:   Ankle MMT L R   Dorsiflexion 4+/5 4-/5   Plantarflexion 4+5 4-/5   Eversion 4+/5 4-/5   Inversion 4/+5 4-/5     Flexibility:  Heel cord    Girth mm:  Figure 8   60 cm     Palpation:  ++tenderness right lateral ankle     Gait:  Using rollator/lace up brace   Modest guarding right ankle WB     Outcome Measures:  LEFS  32/80  60% imparied    Assessment  Pt is a 69 y.o. female who presents with impairments of ankle pain/wekankess . These impairments have led to functional limitations including difficulty with ADLs involing ambulation.. Pt would benefit from skilled physical therapy intervention to improve above impairments and facilitate return to function.    Plan  Up to 9 additional visits  Theapeutic Exercise  Manual  Aquatics  ultrasound    Plan for next visit:   Progress HEP/PREs  Manual PRN    OP EDUCATION:  HEP       Today's Treatment:  Therapeutic Exercise  HEP to be completed daily, exercises include:  Hell cord stretching  Ankle alphabet  Ankle OTB PF    Goals:  STG (3 visits)  Patient to be independent with HEP    LTG (10 visits)   LEFS to  < 30  % impaired   MMT ankle to 4+/5 right   Patient to perform ADLS with pain <2/10   Patient to walk for 30 minutes without need to sit                     Current Participants as of 9/10/2024    Name Type Comments Contact Info    Yuliet Saunders DPM Referring Provider  [phone]    Signature pending    Kar Cameron PT Physical Therapist  [phone]    Signature pending

## 2024-09-17 NOTE — PROGRESS NOTES
"    Physical Therapy Treatment    Patient Name: Anahi Bellamy  MRN: [id number]  Encounter date:  9/18/2024  Time Calculation  Start Time: 0930  Stop Time: 1015  Time Calculation (min): 45 min  PT Intervention time entry  Manual: 10 min  Therapeutic exercise: 25 min     Visit Number:  2 (including evaluation)  Planned total visits: 10  Visits Authorized/Insurance Coverage:  MEDICARE A/B, ANTHEM 50V PT/OT/ST, NO AUTH, ( $0 USED PT/ST)     Current Problem  Problem List Items Addressed This Visit    None  Visit Diagnoses         Codes    Sprain of ankle     S93.409A          Precautions  History of neck/back surgery  CAD  Ankylosing spondylitis  Relevant PMH:  Carotid artery disease  Neuropathy  Right TKA x 3  Back surgery/revision  Neck surgery  Left ankle surgery  HTN  Liver disease  AS    Pain  3/10    Subjective  General       Patient states she has been compliant with the HEP 2 x/day, noting she has still been active caring for grandchildren as able.     Objective      Patient ambulated into the appointment with a rollator, notable limp on RLE, ankle brace doffed in preparation to perform therex, compression stockings donned to help control R ankle swelling. Lateral R ankle swelling, distal to lateral malleolus, observed and palpated during STM. R ankle AROM limited due to lateral swelling and pain.     Treatment:  Manual: Retro massage to reduce swelling in lateral R ankle, pt supine, BLE over wedge    Therapeutic exercises:     Ankle pumps/PF x 20  4-way ankle: PF, Inversion, Eversion, Dorsiflexion x 20, Green TB  Lower case alphabets x 2  Heel cord stretching 2 x 20\" RLE  Toe curls x 20  Great toe extension x 10  Lateral toe extensions x 10    Additional time to review pt condition and set up treatment    Current HEP:  Hell cord stretching  Ankle alphabet  Ankle OTB PF    Activity tolerance:  Good    OP EDUCATION:  Pt advised she is to wear the brace into the appointment and doff it for therex, pt verbalized " understanding.     Assessment:       STM performed with pt supine, mild increase in back pain initially, reduced as pt remained supine. Good response to STM, decreased R ankle swelling and stiffness. Pt reviewed HEP activities, resistance increased to green theraband. Verbal, visual, and tactile cueing provided for 4-way ankle, toe crunches, and toe extension activities, good response. No change in R ankle pain following treatment. Pt donned ankle brace to exit appointment.      Pt's response to treatment:  Good  Areas of improvements: Decreased R ankle swelling, improved exercise tolerance.   Limitations/deficits: Limited ability to ambulate without rollator due to R ankle pain and swelling    Pain end of session: 3/10    Plan:    Continue with current POC/no changes  Continue with land based treatment     Assessment of current progress against goals:  Progressing toward functional goals    Goals:   STG (3 visits)  Patient to be independent with HEP     LTG (10 visits)   LEFS to  < 30  % impaired   MMT ankle to 4+/5 right   Patient to perform ADLS with pain <2/10   Patient to walk for 30 minutes without need to sit

## 2024-09-18 ENCOUNTER — CLINICAL SUPPORT (OUTPATIENT)
Dept: PHYSICAL THERAPY | Facility: CLINIC | Age: 69
End: 2024-09-18
Payer: MEDICARE

## 2024-09-18 DIAGNOSIS — S93.409A SPRAIN OF ANKLE: ICD-10-CM

## 2024-09-18 PROCEDURE — 97140 MANUAL THERAPY 1/> REGIONS: CPT | Mod: CQ,GP | Performed by: SPECIALIST/TECHNOLOGIST

## 2024-09-18 PROCEDURE — 97110 THERAPEUTIC EXERCISES: CPT | Mod: CQ,GP | Performed by: SPECIALIST/TECHNOLOGIST

## 2024-09-23 NOTE — PROGRESS NOTES
"    Physical Therapy Treatment    Patient Name: Anahi Bellamy  MRN: [id number]  Encounter date:  9/24/2024  Time Calculation  Start Time:1035  Stop Time: 1115  Time Calculation (min): 40 min  PT Intervention time entry  Manual: 10 min  Therapeutic exercise: 28 min     Visit Number:  3 (including evaluation)  Planned total visits: 10  Visits Authorized/Insurance Coverage:  MEDICARE A/B, ANTHEM 50V PT/OT/ST, NO AUTH, ( $0 USED PT/ST)     Current Problem  Problem List Items Addressed This Visit    None  Visit Diagnoses         Codes    Sprain of ankle     S93.409A            Precautions  History of neck/back surgery  CAD  Ankylosing spondylitis  Relevant PMH:  Carotid artery disease  Neuropathy  Right TKA x 3  Back surgery/revision  Neck surgery  Left ankle surgery  HTN  Liver disease  AS    Pain  4/10    Subjective  General       Patient reported increased R ankle irritation following previous visit, noting increased posterior-medial ankle pain which she has not had in previous visits. Patient came into appointment with ankle brace donned.    Objective      Decreased R ankle swelling and bruising observed during STM. Decreased R ankle AROM available due to swelling and pain.    Treatment:  Manual: Retro massage to reduce swelling in lateral R ankle, pt supine, BLE over wedge.    Therapeutic exercises:   Seated:  Ankle pumps/PF x 30  4-way ankle: PF, Inversion, Eversion, Dorsiflexion x 20 ea  Lower case alphabets x 2  Heel cord stretching 2 x 20\" RLE  Toe curls x 20  Great toe extension x 20  Lateral toe extensions x 20  Rocker board: x 10 PF/DF, 10 Inversion/Eversion    Therex paced to allow for adequate rest/ decrease R ankle pain     Current HEP:  Heel cord stretching  Ankle alphabet  Ankle OTB PF    Activity tolerance:  Good    OP EDUCATION:    Assessment:       Mild decrease in pain following  Seated therex performed without resistance bands focusing on movement within a pain free AROM, good response, decreased R " ankle pain. Verbal and visual cueing provided for activity on rocker board, good response, no increase in pain with new activity. Overall decreased R ankle pain at the end of the appointment.      Pt's response to treatment: Good  Areas of improvements: R ankle edema continues to decrease. improved exercise tolerance.   Limitations/deficits: R ankle pain and swelling reducing ability to perform ADLs     Pain end of session: 3/10    Plan:    Continue with current POC/no changes  Continue with land based treatment     Assessment of current progress against goals:  Progressing toward functional goals    Goals:   STG (3 visits)  Patient to be independent with HEP     LTG (10 visits)   LEFS to  < 30  % impaired   MMT ankle to 4+/5 right   Patient to perform ADLS with pain <2/10   Patient to walk for 30 minutes without need to sit

## 2024-09-24 ENCOUNTER — CLINICAL SUPPORT (OUTPATIENT)
Dept: PHYSICAL THERAPY | Facility: CLINIC | Age: 69
End: 2024-09-24
Payer: MEDICARE

## 2024-09-24 DIAGNOSIS — S93.409A SPRAIN OF ANKLE: ICD-10-CM

## 2024-09-24 PROCEDURE — 97110 THERAPEUTIC EXERCISES: CPT | Mod: CQ,GP | Performed by: SPECIALIST/TECHNOLOGIST

## 2024-09-24 PROCEDURE — 97140 MANUAL THERAPY 1/> REGIONS: CPT | Mod: CQ,GP | Performed by: SPECIALIST/TECHNOLOGIST

## 2024-09-27 ENCOUNTER — CLINICAL SUPPORT (OUTPATIENT)
Dept: PHYSICAL THERAPY | Facility: CLINIC | Age: 69
End: 2024-09-27
Payer: MEDICARE

## 2024-09-27 DIAGNOSIS — S93.409A SPRAIN OF ANKLE: ICD-10-CM

## 2024-09-27 PROCEDURE — 97110 THERAPEUTIC EXERCISES: CPT | Mod: CQ,GP | Performed by: SPECIALIST/TECHNOLOGIST

## 2024-09-27 PROCEDURE — 97140 MANUAL THERAPY 1/> REGIONS: CPT | Mod: CQ,GP | Performed by: SPECIALIST/TECHNOLOGIST

## 2024-09-30 NOTE — PROGRESS NOTES
"    Physical Therapy Treatment    Patient Name: Anahi Bellamy  MRN: [id number]  Encounter date:  10/1/2024  Time Calculation  Start Time: 1420  Stop Time: 1502  Time Calculation (min): 42 min     PT Therapeutic Procedures Time Entry  Manual Therapy Time Entry: 15  Therapeutic Exercise Time Entry: 25    Visit Number:  5 (including evaluation)  Planned total visits: 10  Visits Authorized/Insurance Coverage:  MEDICARE A/B, ANTHEM 50V PT/OT/ST, NO AUTH, ( $0 USED PT/ST)     Current Problem  Problem List Items Addressed This Visit    None  Visit Diagnoses         Codes    Sprain of ankle     S93.409A          Precautions  History of neck/back surgery  CAD  Ankylosing spondylitis  Relevant PMH:  Carotid artery disease  Neuropathy  Right TKA x 3  Back surgery/revision  Neck surgery  Left ankle surgery  HTN  Liver disease  AS          Pain  Up to 4/10       Subjective  General  Patient reports \"feeling good\" when leaving last visit; soreness returned 1 hour later.  Patient at times feels \"ankle is getting worse\".  Patient to DP tomorrow.    Objective  Pain with active ROM eversion (pain lateral ankle)  Modest swelling lateral malleolus      Treatment:  Manual: STM and retro massage to RLE and gastroc to reduce swelling in lateral R ankle, pt supine, BLE over wedge.     Therapeutic exercises:   Seated:  4-way ankle: PF, Inversion, Eversion, Dorsiflexion x 20 ea w/o resistance  Lower case alphabets x 2  Heel cord stretching 2 x 20\" RLE  Toe curls/ towel scrunches x 20  Great toe extension x 20  Lateral toe extensions x 20  Rocker board: x 20 PF/DF, 20 Inversion/Eversion  Seated calf raises x 10 R/L     Standing weight shifts 2 x 10 ea L/R-     Current HEP:  Heel cord stretching  Ankle alphabet  Ankle OTB PF- hold resistance    Activity tolerance:  good    OP EDUCATION:  HEP    Assessment:  Pt's response to treatment:  good  Areas of improvements: ROM/short term pain reduction  Limitations/deficits:  lingering  pain lateral " ankle    Pain end of session:   0/10    Plan:  Continue with current POC with the following changes advance as tolerated/per DPM guidance after follow up tomorrow.    Assessment of current progress against goals:  Progressing toward functional goals for ROM/strength; however, pain and walking tolerance remain compromised.      Goals:   STG (3 visits)  Patient to be independent with HEP- MET     LTG (10 visits)   LEFS to  < 30  % impaired   MMT ankle to 4+/5 right   Patient to perform ADLS with pain <2/10   Patient to walk for 30 minutes without need to sit

## 2024-10-01 ENCOUNTER — TREATMENT (OUTPATIENT)
Dept: PHYSICAL THERAPY | Facility: CLINIC | Age: 69
End: 2024-10-01
Payer: MEDICARE

## 2024-10-01 DIAGNOSIS — S93.409A SPRAIN OF ANKLE: ICD-10-CM

## 2024-10-01 PROCEDURE — 97140 MANUAL THERAPY 1/> REGIONS: CPT | Mod: GP

## 2024-10-01 PROCEDURE — 97110 THERAPEUTIC EXERCISES: CPT | Mod: GP

## 2024-10-01 NOTE — Clinical Note
October 1, 2024    Yuliet Saunders DPM  [address] [facility]  [address]  [facility] [place] [address]    Patient: Anahi Bellamy   YOB: 1955   Date of Visit: 10/1/2024       Dear Yuliet Saunders DPM  [address] [facility]  [address]  [facility] [place] [address]    The attached plan of care is being sent to you because your patient’s medical reimbursement requires that you certify the plan of care. Your signature is required to allow uninterrupted insurance coverage.      You may indicate your approval by signing below and faxing this form back to us at Dept Fax: [phone].    Please call Dept: [phone] with any questions or concerns.    Thank you for this referral,        Kar Cameron PT  [facility] [address] [place] [address]    Payer: Payor: MEDICARE / Plan: MEDICARE PART A AND B / Product Type: *No Product type* /                                                                         Date:     Dear Kar Cameron PT,     Re: Ms. Anahi Bellamy, MRN:[id number]    I certify that I have reviewed the attached plan of care and it is medically necessary for Ms. Anahi Bellamy (1955) who is under my care.          ______________________________________                    _________________  Provider name and credentials                                           Date and time                                                                                           Plan of Care 10/1/24   Effective from: 10/1/2024  Effective to: 12/30/2024    Plan ID: [id number]            Participants as of Finalize on 10/1/2024    Name Type Comments Contact Info    Yuliet Saunders DPM Referring Provider  [phone]    Kar Cameron PT Physical Therapist  [phone]       Last Plan Note     Author: Kar Cameron PT Status: Signed Last edited: 10/1/2024  2:15 PM           Physical Therapy Treatment    Patient Name: Anahi Bellamy  MRN:  "[id number]  Encounter date:  10/1/2024  Time Calculation  Start Time: 1420  Stop Time: 1502  Time Calculation (min): 42 min     PT Therapeutic Procedures Time Entry  Manual Therapy Time Entry: 15  Therapeutic Exercise Time Entry: 25    Visit Number:  5 (including evaluation)  Planned total visits: 10  Visits Authorized/Insurance Coverage:  MEDICARE A/B, ANTHEM 50V PT/OT/ST, NO AUTH, ( $0 USED PT/ST)     Current Problem  Problem List Items Addressed This Visit    None  Visit Diagnoses         Codes    Sprain of ankle     S93.409A          Precautions  History of neck/back surgery  CAD  Ankylosing spondylitis  Relevant PMH:  Carotid artery disease  Neuropathy  Right TKA x 3  Back surgery/revision  Neck surgery  Left ankle surgery  HTN  Liver disease  AS          Pain  Up to 4/10       Subjective  General  Patient reports \"feeling good\" when leaving last visit; soreness returned 1 hour later.  Patient at times feels \"ankle is getting worse\".  Patient to DP tomorrow.    Objective  Pain with active ROM eversion (pain lateral ankle)  Modest swelling lateral malleolus      Treatment:  Manual: STM and retro massage to RLE and gastroc to reduce swelling in lateral R ankle, pt supine, BLE over wedge.     Therapeutic exercises:   Seated:  4-way ankle: PF, Inversion, Eversion, Dorsiflexion x 20 ea w/o resistance  Lower case alphabets x 2  Heel cord stretching 2 x 20\" RLE  Toe curls/ towel scrunches x 20  Great toe extension x 20  Lateral toe extensions x 20  Rocker board: x 20 PF/DF, 20 Inversion/Eversion  Seated calf raises x 10 R/L     Standing weight shifts 2 x 10 ea L/R-     Current HEP:  Heel cord stretching  Ankle alphabet  Ankle OTB PF- hold resistance    Activity tolerance:  good    OP EDUCATION:  HEP    Assessment:  Pt's response to treatment:  good  Areas of improvements: ROM/short term pain reduction  Limitations/deficits:  lingering  pain lateral ankle    Pain end of session:   0/10    Plan:  Continue with current " POC with the following changes advance as tolerated/per DPM guidance after follow up tomorrow.    Assessment of current progress against goals:  Progressing toward functional goals for ROM/strength; however, pain and walking tolerance remain compromised.      Goals:   STG (3 visits)  Patient to be independent with HEP- MET     LTG (10 visits)   LEFS to  < 30  % impaired   MMT ankle to 4+/5 right   Patient to perform ADLS with pain <2/10   Patient to walk for 30 minutes without need to sit         Current Participants as of 10/1/2024    Name Type Comments Contact Info    Yuliet Saunders DPM Referring Provider  [phone]    Signature pending    Kar Cameron, PT Physical Therapist  [phone]    Signature pending

## 2024-10-03 ENCOUNTER — DOCUMENTATION (OUTPATIENT)
Dept: PHYSICAL THERAPY | Facility: CLINIC | Age: 69
End: 2024-10-03
Payer: COMMERCIAL

## 2024-10-03 NOTE — PROGRESS NOTES
Physical Therapy    Discharge Summary    Name: Anahi Bellamy  MRN: [id number]  : 1955  Date: 10/03/24    Discharge Summary: PT    Discharge Information: Date of discharge 10/3/24, Date of last visit 10/1/24, Date of evaluation 9/10/24, Number of attended visits 5, Referred by Dr. Saunders, and Referred for right ankle sprain    Therapy Summary: Patient with limited improvement with conservative PT.  Patient referred to surgeon/to discontinue PT.    Discharge Status: discharged     Rehab Discharge Reason: Other lack of progress

## 2024-10-04 ENCOUNTER — APPOINTMENT (OUTPATIENT)
Dept: PHYSICAL THERAPY | Facility: CLINIC | Age: 69
End: 2024-10-04
Payer: MEDICARE

## 2024-10-08 ENCOUNTER — APPOINTMENT (OUTPATIENT)
Dept: PHYSICAL THERAPY | Facility: CLINIC | Age: 69
End: 2024-10-08
Payer: MEDICARE

## 2024-10-11 ENCOUNTER — APPOINTMENT (OUTPATIENT)
Dept: PHYSICAL THERAPY | Facility: CLINIC | Age: 69
End: 2024-10-11
Payer: MEDICARE

## 2024-10-15 ENCOUNTER — APPOINTMENT (OUTPATIENT)
Dept: PHYSICAL THERAPY | Facility: CLINIC | Age: 69
End: 2024-10-15
Payer: MEDICARE

## 2024-10-17 ENCOUNTER — APPOINTMENT (OUTPATIENT)
Dept: PHYSICAL THERAPY | Facility: CLINIC | Age: 69
End: 2024-10-17
Payer: MEDICARE

## 2024-10-18 DIAGNOSIS — I10 PRIMARY HYPERTENSION: ICD-10-CM

## 2024-10-21 ENCOUNTER — APPOINTMENT (OUTPATIENT)
Dept: CARDIOLOGY | Facility: CLINIC | Age: 69
End: 2024-10-21
Payer: MEDICARE

## 2024-10-21 VITALS — SYSTOLIC BLOOD PRESSURE: 140 MMHG | OXYGEN SATURATION: 96 % | DIASTOLIC BLOOD PRESSURE: 78 MMHG | HEART RATE: 68 BPM

## 2024-10-21 DIAGNOSIS — Z01.818 PRE-OP EXAM: ICD-10-CM

## 2024-10-21 DIAGNOSIS — I35.0 NONRHEUMATIC AORTIC VALVE STENOSIS: ICD-10-CM

## 2024-10-21 DIAGNOSIS — Z01.810 PREOP CARDIOVASCULAR EXAM: ICD-10-CM

## 2024-10-21 DIAGNOSIS — I10 BENIGN ESSENTIAL HYPERTENSION: Primary | ICD-10-CM

## 2024-10-21 DIAGNOSIS — E78.2 MIXED HYPERLIPIDEMIA: ICD-10-CM

## 2024-10-21 PROCEDURE — 1159F MED LIST DOCD IN RCRD: CPT | Performed by: INTERNAL MEDICINE

## 2024-10-21 PROCEDURE — 3078F DIAST BP <80 MM HG: CPT | Performed by: INTERNAL MEDICINE

## 2024-10-21 PROCEDURE — 1123F ACP DISCUSS/DSCN MKR DOCD: CPT | Performed by: INTERNAL MEDICINE

## 2024-10-21 PROCEDURE — 93010 ELECTROCARDIOGRAM REPORT: CPT | Performed by: INTERNAL MEDICINE

## 2024-10-21 PROCEDURE — 99204 OFFICE O/P NEW MOD 45 MIN: CPT | Performed by: INTERNAL MEDICINE

## 2024-10-21 PROCEDURE — 1036F TOBACCO NON-USER: CPT | Performed by: INTERNAL MEDICINE

## 2024-10-21 PROCEDURE — 1126F AMNT PAIN NOTED NONE PRSNT: CPT | Performed by: INTERNAL MEDICINE

## 2024-10-21 PROCEDURE — 93005 ELECTROCARDIOGRAM TRACING: CPT | Performed by: INTERNAL MEDICINE

## 2024-10-21 PROCEDURE — 3077F SYST BP >= 140 MM HG: CPT | Performed by: INTERNAL MEDICINE

## 2024-10-21 PROCEDURE — 99214 OFFICE O/P EST MOD 30 MIN: CPT | Performed by: INTERNAL MEDICINE

## 2024-10-21 RX ORDER — ATORVASTATIN CALCIUM 80 MG/1
80 TABLET, FILM COATED ORAL DAILY
Qty: 90 TABLET | Refills: 3 | Status: SHIPPED | OUTPATIENT
Start: 2024-10-21

## 2024-10-21 RX ORDER — AMLODIPINE BESYLATE 10 MG/1
10 TABLET ORAL DAILY
Qty: 90 TABLET | Refills: 3 | Status: SHIPPED | OUTPATIENT
Start: 2024-10-21

## 2024-10-21 RX ORDER — LISINOPRIL AND HYDROCHLOROTHIAZIDE 20; 25 MG/1; MG/1
1 TABLET ORAL DAILY
Qty: 90 TABLET | Refills: 3 | Status: SHIPPED | OUTPATIENT
Start: 2024-10-21

## 2024-10-21 ASSESSMENT — ENCOUNTER SYMPTOMS
SHORTNESS OF BREATH: 0
WEIGHT GAIN: 0
WEAKNESS: 0
DIZZINESS: 0
NEAR-SYNCOPE: 0
DYSPNEA ON EXERTION: 0
CLAUDICATION: 0
FEVER: 0
SYNCOPE: 0
OCCASIONAL FEELINGS OF UNSTEADINESS: 0
LOSS OF SENSATION IN FEET: 0
MYALGIAS: 0
WHEEZING: 0
PALPITATIONS: 0
COUGH: 0
ORTHOPNEA: 0
DEPRESSION: 0
IRREGULAR HEARTBEAT: 0
PND: 0
DIAPHORESIS: 0
WEIGHT LOSS: 0

## 2024-10-21 ASSESSMENT — PATIENT HEALTH QUESTIONNAIRE - PHQ9
2. FEELING DOWN, DEPRESSED OR HOPELESS: NOT AT ALL
SUM OF ALL RESPONSES TO PHQ9 QUESTIONS 1 AND 2: 0
1. LITTLE INTEREST OR PLEASURE IN DOING THINGS: NOT AT ALL

## 2024-10-21 ASSESSMENT — PAIN SCALES - GENERAL: PAINLEVEL_OUTOF10: 0-NO PAIN

## 2024-10-21 NOTE — PROGRESS NOTES
Subjective      Chief Complaint   Patient presents with    Establish Care        69-year-old female with history of hypertension and hyperlipidemia presents for cardiac evaluation.  She was a patient of Dr. Byrd.  She was catheterized in 2022 in response to a positive stress test to find normal coronary arteries. She has a history of aortic stenosis and on echocardiogram in April 2024 it was mild with peak velocity across the valve of 276 cm/s.  She was last seen in the office in May 2024,  Norvasc was increased due to uncontrolled hypertension. She is to have ankle surgery.          Review of Systems   Constitutional: Negative for diaphoresis, fever, weight gain and weight loss.   Eyes:  Negative for visual disturbance.   Cardiovascular:  Negative for chest pain, claudication, dyspnea on exertion, irregular heartbeat, leg swelling, near-syncope, orthopnea, palpitations, paroxysmal nocturnal dyspnea and syncope.   Respiratory:  Negative for cough, shortness of breath and wheezing.    Musculoskeletal:  Negative for muscle weakness and myalgias.   Neurological:  Negative for dizziness and weakness.   All other systems reviewed and are negative.       History reviewed. No pertinent past medical history.     History reviewed. No pertinent surgical history.     Social History     Socioeconomic History    Marital status:      Spouse name: Not on file    Number of children: Not on file    Years of education: Not on file    Highest education level: Not on file   Occupational History    Not on file   Tobacco Use    Smoking status: Never    Smokeless tobacco: Never   Substance and Sexual Activity    Alcohol use: Not Currently    Drug use: Never    Sexual activity: Not on file   Other Topics Concern    Not on file   Social History Narrative    Not on file     Social Drivers of Health     Financial Resource Strain: Not on file   Food Insecurity: Not on file   Transportation Needs: Not on file   Physical Activity:  Not on file   Stress: Not on file   Social Connections: Not on file   Intimate Partner Violence: Not on file   Housing Stability: Not on file        Family History   Problem Relation Name Age of Onset    Heart disease Mother      Hypertension Mother      Heart disease Father      Hypertension Father      Stroke Father      Other (bladder cancer) Brother          OBJECTIVE:    Vitals:    10/21/24 0937   BP: 140/78   Pulse: 68   SpO2: 96%        Vitals reviewed.   Constitutional:       Appearance: Normal and healthy appearance. Not in distress.   Pulmonary:      Effort: Pulmonary effort is normal.      Breath sounds: Normal breath sounds.   Cardiovascular:      Normal rate. Regular rhythm. Normal S1. Normal S2.       Murmurs: There is no murmur.      No gallop.  No click.   Pulses:     Intact distal pulses.   Edema:     Peripheral edema absent.   Skin:     General: Skin is warm and dry.   Neurological:      General: No focal deficit present.          Lab Review:   Lab Results   Component Value Date     08/07/2024    K 4.8 08/07/2024     08/07/2024    CO2 26 08/07/2024    BUN 24 08/07/2024    CREATININE 0.70 08/07/2024    GLUCOSE 108 (H) 08/07/2024    CALCIUM 10.1 08/07/2024     Lab Results   Component Value Date    CHOL 154 08/07/2024    TRIG 137 08/07/2024    HDL 49.0 (L) 08/07/2024       Lab Results   Component Value Date    LDLCALC 78 08/07/2024        Nonrheumatic aortic valve stenosis  Mild progressive stage B. Will plan on a repeat echo April 2026    Benign essential hypertension  130s/70s at home. No med changes will reassess after her ankle surgery.     Preop cardiovascular exam  The patient is of low risk for a surgical procedure that carries an inherent moderate risk of adverse cardiac events. In the absence of acute coronary syndrome, acutely decompensated heart failure, hemodynamically significant valvular disease, or malignant arrhythmia I would consider this risk acceptable to proceed without  further cardiac workup or intervention.

## 2024-10-24 ENCOUNTER — APPOINTMENT (OUTPATIENT)
Dept: PREADMISSION TESTING | Facility: HOSPITAL | Age: 69
End: 2024-10-24
Payer: COMMERCIAL

## 2024-10-29 DIAGNOSIS — K21.9 GASTROESOPHAGEAL REFLUX DISEASE WITHOUT ESOPHAGITIS: ICD-10-CM

## 2024-10-29 RX ORDER — OMEPRAZOLE 20 MG/1
CAPSULE, DELAYED RELEASE ORAL
Qty: 90 CAPSULE | Refills: 3 | Status: SHIPPED | OUTPATIENT
Start: 2024-10-29

## 2024-11-04 ENCOUNTER — PRE-ADMISSION TESTING (OUTPATIENT)
Dept: PREADMISSION TESTING | Facility: HOSPITAL | Age: 69
End: 2024-11-04
Payer: COMMERCIAL

## 2024-11-04 VITALS
DIASTOLIC BLOOD PRESSURE: 75 MMHG | OXYGEN SATURATION: 96 % | HEART RATE: 76 BPM | HEIGHT: 65 IN | SYSTOLIC BLOOD PRESSURE: 160 MMHG | BODY MASS INDEX: 48.15 KG/M2 | RESPIRATION RATE: 16 BRPM | TEMPERATURE: 98.4 F | WEIGHT: 289 LBS

## 2024-11-04 DIAGNOSIS — Z01.818 PRE-OP TESTING: ICD-10-CM

## 2024-11-04 LAB
ANION GAP SERPL CALCULATED.3IONS-SCNC: 11 MMOL/L (ref 10–20)
BASOPHILS # BLD AUTO: 0.04 X10*3/UL (ref 0–0.1)
BASOPHILS NFR BLD AUTO: 0.6 %
BUN SERPL-MCNC: 18 MG/DL (ref 6–23)
CALCIUM SERPL-MCNC: 10 MG/DL (ref 8.6–10.3)
CHLORIDE SERPL-SCNC: 104 MMOL/L (ref 98–107)
CO2 SERPL-SCNC: 27 MMOL/L (ref 21–32)
CREAT SERPL-MCNC: 0.66 MG/DL (ref 0.5–1.05)
EGFRCR SERPLBLD CKD-EPI 2021: >90 ML/MIN/1.73M*2
EOSINOPHIL # BLD AUTO: 0.1 X10*3/UL (ref 0–0.7)
EOSINOPHIL NFR BLD AUTO: 1.6 %
ERYTHROCYTE [DISTWIDTH] IN BLOOD BY AUTOMATED COUNT: 13 % (ref 11.5–14.5)
GLUCOSE SERPL-MCNC: 101 MG/DL (ref 74–99)
HCT VFR BLD AUTO: 36.9 % (ref 36–46)
HGB BLD-MCNC: 12.3 G/DL (ref 12–16)
IMM GRANULOCYTES # BLD AUTO: 0.02 X10*3/UL (ref 0–0.7)
IMM GRANULOCYTES NFR BLD AUTO: 0.3 % (ref 0–0.9)
LYMPHOCYTES # BLD AUTO: 2.82 X10*3/UL (ref 1.2–4.8)
LYMPHOCYTES NFR BLD AUTO: 44.2 %
MCH RBC QN AUTO: 31.4 PG (ref 26–34)
MCHC RBC AUTO-ENTMCNC: 33.3 G/DL (ref 32–36)
MCV RBC AUTO: 94 FL (ref 80–100)
MONOCYTES # BLD AUTO: 0.58 X10*3/UL (ref 0.1–1)
MONOCYTES NFR BLD AUTO: 9.1 %
NEUTROPHILS # BLD AUTO: 2.82 X10*3/UL (ref 1.2–7.7)
NEUTROPHILS NFR BLD AUTO: 44.2 %
NRBC BLD-RTO: 0 /100 WBCS (ref 0–0)
PLATELET # BLD AUTO: 229 X10*3/UL (ref 150–450)
POTASSIUM SERPL-SCNC: 4.1 MMOL/L (ref 3.5–5.3)
RBC # BLD AUTO: 3.92 X10*6/UL (ref 4–5.2)
SODIUM SERPL-SCNC: 138 MMOL/L (ref 136–145)
WBC # BLD AUTO: 6.4 X10*3/UL (ref 4.4–11.3)

## 2024-11-04 PROCEDURE — 85025 COMPLETE CBC W/AUTO DIFF WBC: CPT

## 2024-11-04 PROCEDURE — 99204 OFFICE O/P NEW MOD 45 MIN: CPT | Performed by: PHYSICIAN ASSISTANT

## 2024-11-04 PROCEDURE — 80048 BASIC METABOLIC PNL TOTAL CA: CPT

## 2024-11-04 PROCEDURE — 36415 COLL VENOUS BLD VENIPUNCTURE: CPT

## 2024-11-04 RX ORDER — IBUPROFEN 200 MG
400 TABLET ORAL EVERY 6 HOURS PRN
COMMUNITY

## 2024-11-04 ASSESSMENT — DUKE ACTIVITY SCORE INDEX (DASI)
CAN YOU PARTICIPATE IN STRENOUS SPORTS LIKE SWIMMING, SINGLES TENNIS, FOOTBALL, BASKETBALL, OR SKIING: NO
CAN YOU DO HEAVY WORK AROUND THE HOUSE LIKE SCRUBBING FLOORS OR LIFTING AND MOVING HEAVY FURNITURE: NO
CAN YOU WALK A BLOCK OR TWO ON LEVEL GROUND: YES
CAN YOU DO MODERATE WORK AROUND THE HOUSE LIKE VACUUMING, SWEEPING FLOORS OR CARRYING GROCERIES: YES
DASI METS SCORE: 5.1
CAN YOU WALK INDOORS, SUCH AS AROUND YOUR HOUSE: YES
TOTAL_SCORE: 19.45
CAN YOU TAKE CARE OF YOURSELF (EAT, DRESS, BATHE, OR USE TOILET): YES
CAN YOU DO YARD WORK LIKE RAKING LEAVES, WEEDING OR PUSHING A MOWER: NO
CAN YOU HAVE SEXUAL RELATIONS: NO
CAN YOU DO LIGHT WORK AROUND THE HOUSE LIKE DUSTING OR WASHING DISHES: YES
CAN YOU RUN A SHORT DISTANCE: NO
CAN YOU PARTICIPATE IN MODERATE RECREATIONAL ACTIVITIES LIKE GOLF, BOWLING, DANCING, DOUBLES TENNIS OR THROWING A BASEBALL OR FOOTBALL: YES
CAN YOU CLIMB A FLIGHT OF STAIRS OR WALK UP A HILL: NO

## 2024-11-04 ASSESSMENT — ENCOUNTER SYMPTOMS
CONSTITUTIONAL NEGATIVE: 1
ARTHRALGIAS: 1
RESPIRATORY NEGATIVE: 1
CARDIOVASCULAR NEGATIVE: 1
BACK PAIN: 1
PSYCHIATRIC NEGATIVE: 1
EYES NEGATIVE: 1
GASTROINTESTINAL NEGATIVE: 1
NEUROLOGICAL NEGATIVE: 1
TROUBLE SWALLOWING: 1

## 2024-11-04 NOTE — CPM/PAT H&P
"CPM/PAT Evaluation       Name: Anahi Bellamy (Anahi Bellamy)  /Age: 1955/69 y.o.     In-Person       Chief Complaint: \"ankle pain\"    HPI  The patient is a 69 year old female.  Approximately 5 months ago she \"twisted\" her right ankle.   Since that time she has had chronic, right lateral ankle pain with rest and this increases in intensity with weight bearing activities.  The pain occasionally radiates to the right calf.  She has associated right ankle swelling, weakness and instability, but no numbness, tingling or falls.  She has done physical therapy, wore a boot for 6 weeks and is now wearing an ankle brace.  She is currently ambulating with a walker.  She has not improved and surgical intervention is recommended at this time.      Past Medical History:   Diagnosis Date    Ankylosing spondylitis     Arthritis     AS (aortic stenosis)     Fibromyalgia, primary     GERD (gastroesophageal reflux disease)     Hyperlipidemia     Hypertension     Hypothyroidism     Obesity     Sleep apnea        Past Surgical History:   Procedure Laterality Date    ANKLE SURGERY Left     CARDIAC CATHETERIZATION      Normal coronary arteries    CERVICAL FUSION      COLONOSCOPY      ESOPHAGOGASTRODUODENOSCOPY      HYSTERECTOMY      LUMBAR FUSION      TONSILLECTOMY  1979    TOTAL KNEE ARTHROPLASTY Right 2015    TOTAL KNEE ARTHROPLASTY Left 2014    TOTAL KNEE ARTHROPLASTY Right     Revision X 2     Family History   Problem Relation Name Age of Onset    Heart disease Mother      Hypertension Mother      Heart disease Father      Hypertension Father      Stroke Father      Other (bladder cancer) Brother       Social History     Tobacco Use    Smoking status: Former     Current packs/day: 0.00     Average packs/day: 1 pack/day for 24.0 years (24.0 ttl pk-yrs)     Types: Cigarettes     Start date:      Quit date:      Years since quittin.8    Smokeless tobacco: Never   Substance Use Topics    Alcohol use: Yes     " Comment: occ     Social History     Substance and Sexual Activity   Drug Use Never     Allergies   Allergen Reactions    Tramadol Other     BREATHING PROBLEMS    Morphine Nausea/vomiting    Adhesive Tape-Silicones Unknown    Hydrocodone-Acetaminophen Unknown     Redness - Irritation    Codeine Rash and Other     pruritis    Oxycodone Other    Oxycodone-Acetaminophen Other     Redness - Irritation    Propoxyphene N-Acetaminophen Rash     Current Outpatient Medications   Medication Sig Dispense Refill    amLODIPine (Norvasc) 10 mg tablet Take 1 tablet (10 mg) by mouth once daily. 90 tablet 3    atorvastatin (Lipitor) 80 mg tablet Take 1 tablet (80 mg) by mouth once daily. 90 tablet 3    cholecalciferol (Vitamin D3) 25 MCG (1000 UT) tablet Take 1 tablet (1,000 Units) by mouth once daily.      CRANBERRY ORAL Take by mouth. As directed      cyanocobalamin, vitamin B-12, (VITAMIN B-12 ORAL) Take by mouth early in the morning..      etanercept (EnbreL) 50 mg/mL (1 mL) injection Inject 1 mL (50 mg) under the skin 1 (one) time per week. mondays      gabapentin (Neurontin) 300 mg capsule Take 2 capsules (600 mg) by mouth 3 times a day. 180 capsule 11    ibuprofen 200 mg tablet Take 2 tablets (400 mg) by mouth every 6 hours if needed for mild pain (1 - 3).      levothyroxine (Synthroid, Levoxyl) 100 mcg tablet TAKE 1 TABLET(100 MCG) BY MOUTH EVERY DAY 90 tablet 3    lisinopriL-hydrochlorothiazide 20-25 mg tablet TAKE 1 TABLET BY MOUTH EVERY DAY 90 tablet 3    multivit-min/ferrous fumarate (MULTI VITAMIN ORAL) Take 1 tablet by mouth once daily.      multivitamin with minerals (HAIR,SKIN AND NAILS ORAL) Take 5,000 mcg by mouth. As directed      omeprazole (PriLOSEC) 20 mg DR capsule TAKE 1 CAPSULE BY MOUTH ONCE DAILY IN AM TAKE BEFORE A MEAL 90 capsule 3    sulfaSALAzine (Azulfidine) 500 mg tablet Take 2 tablets (1,000 mg) by mouth 2 times a day.       No current facility-administered medications for this visit.     Review of  "Systems   Constitutional: Negative.    HENT:  Positive for trouble swallowing (occasionally with pills).    Eyes: Negative.    Respiratory: Negative.     Cardiovascular: Negative.    Gastrointestinal: Negative.    Genitourinary: Negative.    Musculoskeletal:  Positive for arthralgias and back pain.   Neurological: Negative.    Psychiatric/Behavioral: Negative.       /75   Pulse 76   Temp 36.9 °C (98.4 °F) (Temporal)   Resp 16   Ht 1.651 m (5' 5\")   Wt 131 kg (289 lb)   SpO2 96%   BMI 48.09 kg/m²     Physical Exam  Vitals reviewed.   Constitutional:       Appearance: She is obese.   HENT:      Head: Normocephalic and atraumatic.      Mouth/Throat:      Mouth: Mucous membranes are moist.      Pharynx: Oropharynx is clear.   Eyes:      Extraocular Movements: Extraocular movements intact.      Pupils: Pupils are equal, round, and reactive to light.      Comments: Glasses     Cardiovascular:      Rate and Rhythm: Normal rate and regular rhythm.      Heart sounds: Normal heart sounds.      Comments: Distant heart sounds  Pulmonary:      Effort: Pulmonary effort is normal.      Breath sounds: Normal breath sounds.   Abdominal:      General: Bowel sounds are normal.      Palpations: Abdomen is soft.   Musculoskeletal:      Comments: Brace in place right ankle.  Right ankle range of motion deferred due to pain.   Skin:     General: Skin is warm and dry.   Neurological:      General: No focal deficit present.      Mental Status: She is alert and oriented to person, place, and time.   Psychiatric:         Mood and Affect: Mood normal.         Behavior: Behavior normal.        PAT AIRWAY:   Airway:     Mallampati::  I    TM distance::  >3 FB    Neck ROM::  Limited   Teeth intact    ASA:   III  DASI SCORE:  19.45  METS SCORE:  5.1  CHAD2 SCORE:  2.8%  REVISED CARDIAC RISK INDEX:  0.4%  STOP BANG SCORE:  5  CAPRINI DVT SCORE:  8  ADAL SCORE:  0.17%  ARISCAT SCORE:   1.6%    CBC, BMP ordered during PAT visit  EKG " 10/21/2024  normal sinus rhythm, poor R wave progression across precordial leads (EPIC error - unable to view actual EKG tracing)    Assessment and Plan:     Sprain of ligament of right ankle, arthritis of right ankle, recurrent derangement of right ankle, peroneal tendinitis of right lower extremity, chronic pain of right ankle:  Right ankle secondary repair anterior talofibular ligament and calcaneofibular ligament, right ankle arthroscopy with extensive debridement, right ankle tibial one spur resection, right peroneal retinaculum repair, application splint right lower extremity  Aortic Stenosis - currently stable - followed by Dr. Eldon Schwartz - cardiac clearance done 10/21/2024 - note under ENCOUNTERS TAB   ALPHONSO - compliant with CPAP  Hypertension - taking Norvasc and lisinopril/hydrochlorothiazide  Ankylosing spondylitis - managed with sulfasalazine and Enbrel, last dose Enbrel 11/11/2024  Morbid Obesity - BMI:  48.09    Emilie Ghosh PA-C

## 2024-11-04 NOTE — PREPROCEDURE INSTRUCTIONS
PAT DISCHARGE INSTRUCTIONS    Please call the Same Day Surgery (SDS) Department of the hospital where your procedure will be performed after 2:00 PM the day before your surgery. If you are scheduled on a Monday, or a Tuesday following a Monday holiday, you will need to call on the last business day prior to your surgery.      [place] [id number] [place] [id number] [phone]      Please let your surgeon know if:      You develop any open sores, shingles, burning or painful urination as these may increase your risk of an infection.   You no longer wish to have the surgery.   Any other personal circumstances change that may lead to the need to cancel or defer this surgery-such as being sick or getting admitted to any hospital within one week of your planned procedure.    Your contact details change, such as a change of address or phone number.    Starting now:     Please DO NOT drink alcohol or smoke for 24 hours before surgery. It is well known that quitting smoking can make a huge difference to your health and recovery from surgery. The longer you abstain from smoking, the better your chances of a healthy recovery. If you need help with quitting, call 1-800-QUIT-NOW to be connected to a trained counselor who will discuss the best methods to help you quit.     Before your surgery:    Please stop all supplements 7 days prior to surgery. Or as directed by your surgeon.   Please stop taking NSAID pain medicine such as Advil and Motrin 7 days before surgery.    If you develop any fever, cough, cold, rashes, cuts, scratches, scrapes, urinary symptoms or infection anywhere on your body (including teeth and gums) prior to surgery, please call your surgeon’s office as soon as possible. This may require treatment to reduce the chance of cancellation on the day of surgery.    The day before your surgery:   Get a good night’s rest.  Use the special soap for bathing if you have been  instructed to use one.    Scheduled surgery times may change and you will be notified if this occurs - please check your personal voicemail for any updates.     On the morning of surgery:   Wear comfortable, loose fitting clothes which open in the front. Please do not wear moisturizers, creams, lotions, makeup or perfume.    Please bring with you to surgery:   Photo ID and insurance card   Current list of medicines and allergies   Pacemaker/ Defibrillator/Heart stent cards   CPAP machine and mask    Slings/ splints/ crutches   A copy of your complete advanced directive/DHPOA.    Please do NOT bring with you to surgery:   All jewelry and valuables should be left at home.   Prosthetic devices such as contact lenses, hearing aids, dentures, eyelash extensions, hairpins and body piercings must be removed prior to going in to the surgical suite.    After outpatient surgery:   A responsible adult MUST accompany you at the time of discharge and stay with you for 24 hours after your surgery. You may NOT drive yourself home after surgery.    Do not drive, operate machinery, make critical decisions or do activities that require co-ordination or balance until after a night’s sleep.   Do not drink alcoholic beverages for 24 hours.   Instructions for resuming your medications will be provided by your surgeon.    CALL YOUR DOCTOR AFTER SURGERY IF YOU HAVE:     Chills and/or a fever of 101° F or higher.    Redness, swelling, pus or drainage from your surgical wound or a bad smell from the wound.    Lightheadedness, fainting or confusion.    Persistent vomiting (throwing up) and are not able to eat or drink for 12 hours.    Three or more loose, watery bowel movements in 24 hours (diarrhea).   Difficulty or pain while urinating( after non-urological surgery)    Pain and swelling in your legs, especially if it is only on one side.    Difficulty breathing or are breathing faster than normal.    Any new concerning  symptoms.        Preoperative Fasting Guidelines    Why must I stop eating and drinking near surgery time?  With sedation, food or liquid in your stomach can enter your lungs causing serious complications  Increases nausea and vomiting    When do I need to stop eating and drinking before my surgery?  Do not eat any food after midnight the night before your surgery/procedure.  You may have up to 13 ounces of clear liquid until TWO hours before your instructed arrival time to the hospital.  This includes water, black tea/coffee, (no milk or cream) apple juice, and electrolyte drinks (Gatorade)  You may chew gum until TWO hours before your surgery/procedure     Medication List            Accurate as of November 4, 2024 10:50 AM. Always use your most recent med list.                amLODIPine 10 mg tablet  Commonly known as: Norvasc  Take 1 tablet (10 mg) by mouth once daily.  Medication Adjustments for Surgery: Take on the morning of surgery     atorvastatin 80 mg tablet  Commonly known as: Lipitor  Take 1 tablet (80 mg) by mouth once daily.  Medication Adjustments for Surgery: Take on the morning of surgery     CRANBERRY ORAL  Additional Medication Adjustments for Surgery: Take last dose 7 days before surgery     EnbreL 50 mg/mL (1 mL) injection  Generic drug: etanercept  Last dose before surgery will be Monday Nov 11     gabapentin 300 mg capsule  Commonly known as: Neurontin  Take 2 capsules (600 mg) by mouth 3 times a day.  Medication Adjustments for Surgery: Take on the morning of surgery     HAIR,SKIN AND NAILS ORAL  Additional Medication Adjustments for Surgery: Take last dose 7 days before surgery     ibuprofen 200 mg tablet  Additional Medication Adjustments for Surgery: Take last dose 7 days before surgery     levothyroxine 100 mcg tablet  Commonly known as: Synthroid, Levoxyl  TAKE 1 TABLET(100 MCG) BY MOUTH EVERY DAY  Medication Adjustments for Surgery: Take on the morning of surgery      lisinopriL-hydrochlorothiazide 20-25 mg tablet  TAKE 1 TABLET BY MOUTH EVERY DAY  Medication Adjustments for Surgery: Take last dose 1 day (24 hours) before surgery     MULTI VITAMIN ORAL  Additional Medication Adjustments for Surgery: Take last dose 7 days before surgery     omeprazole 20 mg DR capsule  Commonly known as: PriLOSEC  TAKE 1 CAPSULE BY MOUTH ONCE DAILY IN AM TAKE BEFORE A MEAL  Medication Adjustments for Surgery: Take/Use as prescribed  Notes to patient: CONTINUE PER USUAL/TAKE NIGHT BEFORE SURGERY     sulfaSALAzine 500 mg tablet  Commonly known as: Azulfidine  Medication Adjustments for Surgery: Take on the morning of surgery     VITAMIN B-12 ORAL  Additional Medication Adjustments for Surgery: Take last dose 7 days before surgery     Vitamin D3 25 MCG (1000 UT) tablet  Generic drug: cholecalciferol  Additional Medication Adjustments for Surgery: Take last dose 7 days before surgery

## 2024-11-22 ENCOUNTER — APPOINTMENT (OUTPATIENT)
Dept: RADIOLOGY | Facility: HOSPITAL | Age: 69
End: 2024-11-22
Payer: COMMERCIAL

## 2024-11-22 ENCOUNTER — PHARMACY VISIT (OUTPATIENT)
Dept: PHARMACY | Facility: CLINIC | Age: 69
End: 2024-11-22
Payer: MEDICARE

## 2024-11-22 ENCOUNTER — ANESTHESIA EVENT (OUTPATIENT)
Dept: OPERATING ROOM | Facility: HOSPITAL | Age: 69
End: 2024-11-22
Payer: COMMERCIAL

## 2024-11-22 ENCOUNTER — HOSPITAL ENCOUNTER (OUTPATIENT)
Facility: HOSPITAL | Age: 69
Setting detail: OUTPATIENT SURGERY
Discharge: HOME | End: 2024-11-22
Attending: STUDENT IN AN ORGANIZED HEALTH CARE EDUCATION/TRAINING PROGRAM | Admitting: STUDENT IN AN ORGANIZED HEALTH CARE EDUCATION/TRAINING PROGRAM
Payer: COMMERCIAL

## 2024-11-22 ENCOUNTER — ANESTHESIA (OUTPATIENT)
Dept: OPERATING ROOM | Facility: HOSPITAL | Age: 69
End: 2024-11-22
Payer: COMMERCIAL

## 2024-11-22 VITALS
BODY MASS INDEX: 47.43 KG/M2 | RESPIRATION RATE: 20 BRPM | SYSTOLIC BLOOD PRESSURE: 149 MMHG | WEIGHT: 285 LBS | TEMPERATURE: 97.3 F | HEART RATE: 69 BPM | OXYGEN SATURATION: 98 % | DIASTOLIC BLOOD PRESSURE: 90 MMHG

## 2024-11-22 DIAGNOSIS — Z01.818 PRE-OP TESTING: Primary | ICD-10-CM

## 2024-11-22 DIAGNOSIS — G89.18 POST-OPERATIVE PAIN: ICD-10-CM

## 2024-11-22 DIAGNOSIS — Z98.890 POST-OPERATIVE STATE: ICD-10-CM

## 2024-11-22 PROCEDURE — C1713 ANCHOR/SCREW BN/BN,TIS/BN: HCPCS | Performed by: STUDENT IN AN ORGANIZED HEALTH CARE EDUCATION/TRAINING PROGRAM

## 2024-11-22 PROCEDURE — 3700000001 HC GENERAL ANESTHESIA TIME - INITIAL BASE CHARGE: Performed by: STUDENT IN AN ORGANIZED HEALTH CARE EDUCATION/TRAINING PROGRAM

## 2024-11-22 PROCEDURE — 2500000004 HC RX 250 GENERAL PHARMACY W/ HCPCS (ALT 636 FOR OP/ED): Performed by: STUDENT IN AN ORGANIZED HEALTH CARE EDUCATION/TRAINING PROGRAM

## 2024-11-22 PROCEDURE — 7100000002 HC RECOVERY ROOM TIME - EACH INCREMENTAL 1 MINUTE: Performed by: STUDENT IN AN ORGANIZED HEALTH CARE EDUCATION/TRAINING PROGRAM

## 2024-11-22 PROCEDURE — RXMED WILLOW AMBULATORY MEDICATION CHARGE

## 2024-11-22 PROCEDURE — 2500000004 HC RX 250 GENERAL PHARMACY W/ HCPCS (ALT 636 FOR OP/ED): Performed by: ANESTHESIOLOGY

## 2024-11-22 PROCEDURE — 2720000007 HC OR 272 NO HCPCS: Performed by: STUDENT IN AN ORGANIZED HEALTH CARE EDUCATION/TRAINING PROGRAM

## 2024-11-22 PROCEDURE — 3600000009 HC OR TIME - EACH INCREMENTAL 1 MINUTE - PROCEDURE LEVEL FOUR: Performed by: STUDENT IN AN ORGANIZED HEALTH CARE EDUCATION/TRAINING PROGRAM

## 2024-11-22 PROCEDURE — C1889 IMPLANT/INSERT DEVICE, NOC: HCPCS | Performed by: STUDENT IN AN ORGANIZED HEALTH CARE EDUCATION/TRAINING PROGRAM

## 2024-11-22 PROCEDURE — 2500000004 HC RX 250 GENERAL PHARMACY W/ HCPCS (ALT 636 FOR OP/ED): Performed by: NURSE ANESTHETIST, CERTIFIED REGISTERED

## 2024-11-22 PROCEDURE — 7100000001 HC RECOVERY ROOM TIME - INITIAL BASE CHARGE: Performed by: STUDENT IN AN ORGANIZED HEALTH CARE EDUCATION/TRAINING PROGRAM

## 2024-11-22 PROCEDURE — 3600000004 HC OR TIME - INITIAL BASE CHARGE - PROCEDURE LEVEL FOUR: Performed by: STUDENT IN AN ORGANIZED HEALTH CARE EDUCATION/TRAINING PROGRAM

## 2024-11-22 PROCEDURE — 2780000003 HC OR 278 NO HCPCS: Performed by: STUDENT IN AN ORGANIZED HEALTH CARE EDUCATION/TRAINING PROGRAM

## 2024-11-22 PROCEDURE — 7100000010 HC PHASE TWO TIME - EACH INCREMENTAL 1 MINUTE: Performed by: STUDENT IN AN ORGANIZED HEALTH CARE EDUCATION/TRAINING PROGRAM

## 2024-11-22 PROCEDURE — 3700000002 HC GENERAL ANESTHESIA TIME - EACH INCREMENTAL 1 MINUTE: Performed by: STUDENT IN AN ORGANIZED HEALTH CARE EDUCATION/TRAINING PROGRAM

## 2024-11-22 PROCEDURE — 7100000009 HC PHASE TWO TIME - INITIAL BASE CHARGE: Performed by: STUDENT IN AN ORGANIZED HEALTH CARE EDUCATION/TRAINING PROGRAM

## 2024-11-22 DEVICE — IMPLANTABLE DEVICE: Type: IMPLANTABLE DEVICE | Site: ANKLE | Status: FUNCTIONAL

## 2024-11-22 RX ORDER — MEPERIDINE HYDROCHLORIDE 25 MG/ML
12.5 INJECTION INTRAMUSCULAR; INTRAVENOUS; SUBCUTANEOUS EVERY 10 MIN PRN
Status: DISCONTINUED | OUTPATIENT
Start: 2024-11-22 | End: 2024-11-22 | Stop reason: HOSPADM

## 2024-11-22 RX ORDER — CEFAZOLIN SODIUM 2 G/100ML
INJECTION, SOLUTION INTRAVENOUS AS NEEDED
Status: DISCONTINUED | OUTPATIENT
Start: 2024-11-22 | End: 2024-11-22

## 2024-11-22 RX ORDER — FENTANYL CITRATE 50 UG/ML
INJECTION, SOLUTION INTRAMUSCULAR; INTRAVENOUS AS NEEDED
Status: DISCONTINUED | OUTPATIENT
Start: 2024-11-22 | End: 2024-11-22

## 2024-11-22 RX ORDER — BUPIVACAINE HYDROCHLORIDE 5 MG/ML
INJECTION, SOLUTION PERINEURAL AS NEEDED
Status: DISCONTINUED | OUTPATIENT
Start: 2024-11-22 | End: 2024-11-22 | Stop reason: HOSPADM

## 2024-11-22 RX ORDER — LABETALOL HYDROCHLORIDE 5 MG/ML
INJECTION, SOLUTION INTRAVENOUS AS NEEDED
Status: DISCONTINUED | OUTPATIENT
Start: 2024-11-22 | End: 2024-11-22

## 2024-11-22 RX ORDER — ROCURONIUM BROMIDE 10 MG/ML
INJECTION, SOLUTION INTRAVENOUS AS NEEDED
Status: DISCONTINUED | OUTPATIENT
Start: 2024-11-22 | End: 2024-11-22

## 2024-11-22 RX ORDER — LIDOCAINE HYDROCHLORIDE 10 MG/ML
0.1 INJECTION, SOLUTION INFILTRATION; PERINEURAL ONCE
Status: DISCONTINUED | OUTPATIENT
Start: 2024-11-22 | End: 2024-11-22 | Stop reason: HOSPADM

## 2024-11-22 RX ORDER — ACETAMINOPHEN 10 MG/ML
INJECTION, SOLUTION INTRAVENOUS AS NEEDED
Status: DISCONTINUED | OUTPATIENT
Start: 2024-11-22 | End: 2024-11-22

## 2024-11-22 RX ORDER — CYCLOBENZAPRINE HCL 10 MG
10 TABLET ORAL 3 TIMES DAILY
Qty: 42 TABLET | Refills: 0 | Status: SHIPPED | OUTPATIENT
Start: 2024-11-22 | End: 2024-12-06

## 2024-11-22 RX ORDER — CEFAZOLIN 1 G/1
INJECTION, POWDER, FOR SOLUTION INTRAVENOUS AS NEEDED
Status: DISCONTINUED | OUTPATIENT
Start: 2024-11-22 | End: 2024-11-22

## 2024-11-22 RX ORDER — MIDAZOLAM HYDROCHLORIDE 1 MG/ML
1 INJECTION, SOLUTION INTRAMUSCULAR; INTRAVENOUS ONCE AS NEEDED
Status: DISCONTINUED | OUTPATIENT
Start: 2024-11-22 | End: 2024-11-22 | Stop reason: HOSPADM

## 2024-11-22 RX ORDER — FENTANYL CITRATE 50 UG/ML
50 INJECTION, SOLUTION INTRAMUSCULAR; INTRAVENOUS EVERY 5 MIN PRN
Status: DISCONTINUED | OUTPATIENT
Start: 2024-11-22 | End: 2024-11-22 | Stop reason: HOSPADM

## 2024-11-22 RX ORDER — LIDOCAINE HYDROCHLORIDE 10 MG/ML
INJECTION, SOLUTION INFILTRATION; PERINEURAL AS NEEDED
Status: DISCONTINUED | OUTPATIENT
Start: 2024-11-22 | End: 2024-11-22

## 2024-11-22 RX ORDER — ONDANSETRON HYDROCHLORIDE 2 MG/ML
4 INJECTION, SOLUTION INTRAVENOUS ONCE AS NEEDED
Status: DISCONTINUED | OUTPATIENT
Start: 2024-11-22 | End: 2024-11-22 | Stop reason: HOSPADM

## 2024-11-22 RX ORDER — ALBUTEROL SULFATE 0.83 MG/ML
2.5 SOLUTION RESPIRATORY (INHALATION) ONCE
Status: DISCONTINUED | OUTPATIENT
Start: 2024-11-22 | End: 2024-11-22 | Stop reason: HOSPADM

## 2024-11-22 RX ORDER — SODIUM CHLORIDE, SODIUM LACTATE, POTASSIUM CHLORIDE, CALCIUM CHLORIDE 600; 310; 30; 20 MG/100ML; MG/100ML; MG/100ML; MG/100ML
100 INJECTION, SOLUTION INTRAVENOUS CONTINUOUS
Status: DISCONTINUED | OUTPATIENT
Start: 2024-11-22 | End: 2024-11-22 | Stop reason: HOSPADM

## 2024-11-22 RX ORDER — HYDROCODONE BITARTRATE AND ACETAMINOPHEN 5; 325 MG/1; MG/1
1 TABLET ORAL EVERY 6 HOURS PRN
Qty: 28 TABLET | Refills: 0 | Status: SHIPPED | OUTPATIENT
Start: 2024-11-22 | End: 2024-11-29

## 2024-11-22 RX ORDER — SODIUM CHLORIDE, SODIUM LACTATE, POTASSIUM CHLORIDE, CALCIUM CHLORIDE 600; 310; 30; 20 MG/100ML; MG/100ML; MG/100ML; MG/100ML
INJECTION, SOLUTION INTRAVENOUS CONTINUOUS PRN
Status: DISCONTINUED | OUTPATIENT
Start: 2024-11-22 | End: 2024-11-22

## 2024-11-22 RX ORDER — PROPOFOL 10 MG/ML
INJECTION, EMULSION INTRAVENOUS AS NEEDED
Status: DISCONTINUED | OUTPATIENT
Start: 2024-11-22 | End: 2024-11-22

## 2024-11-22 RX ORDER — ASPIRIN 325 MG
325 TABLET, DELAYED RELEASE (ENTERIC COATED) ORAL DAILY
Qty: 30 TABLET | Refills: 0 | Status: SHIPPED | OUTPATIENT
Start: 2024-11-22 | End: 2024-12-22

## 2024-11-22 RX ORDER — HYDROMORPHONE HYDROCHLORIDE 0.2 MG/ML
0.2 INJECTION INTRAMUSCULAR; INTRAVENOUS; SUBCUTANEOUS EVERY 5 MIN PRN
Status: DISCONTINUED | OUTPATIENT
Start: 2024-11-22 | End: 2024-11-22 | Stop reason: HOSPADM

## 2024-11-22 RX ORDER — DIPHENHYDRAMINE HCL 25 MG
25 TABLET ORAL EVERY 6 HOURS PRN
Qty: 24 TABLET | Refills: 0 | Status: SHIPPED | OUTPATIENT
Start: 2024-11-22

## 2024-11-22 RX ORDER — MIDAZOLAM HYDROCHLORIDE 1 MG/ML
INJECTION, SOLUTION INTRAMUSCULAR; INTRAVENOUS AS NEEDED
Status: DISCONTINUED | OUTPATIENT
Start: 2024-11-22 | End: 2024-11-22

## 2024-11-22 RX ORDER — ONDANSETRON HYDROCHLORIDE 2 MG/ML
INJECTION, SOLUTION INTRAVENOUS AS NEEDED
Status: DISCONTINUED | OUTPATIENT
Start: 2024-11-22 | End: 2024-11-22

## 2024-11-22 RX ADMIN — FENTANYL CITRATE 50 MCG: 50 INJECTION INTRAMUSCULAR; INTRAVENOUS at 12:49

## 2024-11-22 RX ADMIN — FENTANYL CITRATE 50 MCG: 50 INJECTION INTRAMUSCULAR; INTRAVENOUS at 12:24

## 2024-11-22 RX ADMIN — FENTANYL CITRATE 50 MCG: 50 INJECTION INTRAMUSCULAR; INTRAVENOUS at 12:18

## 2024-11-22 ASSESSMENT — COLUMBIA-SUICIDE SEVERITY RATING SCALE - C-SSRS
2. HAVE YOU ACTUALLY HAD ANY THOUGHTS OF KILLING YOURSELF?: NO
6. HAVE YOU EVER DONE ANYTHING, STARTED TO DO ANYTHING, OR PREPARED TO DO ANYTHING TO END YOUR LIFE?: NO
1. IN THE PAST MONTH, HAVE YOU WISHED YOU WERE DEAD OR WISHED YOU COULD GO TO SLEEP AND NOT WAKE UP?: NO

## 2024-11-22 ASSESSMENT — PAIN DESCRIPTION - LOCATION
LOCATION: ANKLE

## 2024-11-22 ASSESSMENT — PAIN SCALES - GENERAL
PAINLEVEL_OUTOF10: 10 - WORST POSSIBLE PAIN
PAINLEVEL_OUTOF10: 4
PAINLEVEL_OUTOF10: 2
PAINLEVEL_OUTOF10: 1
PAINLEVEL_OUTOF10: 6
PAINLEVEL_OUTOF10: 8

## 2024-11-22 ASSESSMENT — PAIN DESCRIPTION - ORIENTATION
ORIENTATION: RIGHT

## 2024-11-22 ASSESSMENT — PAIN DESCRIPTION - DESCRIPTORS
DESCRIPTORS: ACHING
DESCRIPTORS: DULL

## 2024-11-22 ASSESSMENT — PAIN - FUNCTIONAL ASSESSMENT
PAIN_FUNCTIONAL_ASSESSMENT: 0-10

## 2024-11-22 NOTE — OP NOTE
Ankle Secondary Repair Anterior Talofibular Ligament (R), Ankle Arthroscopy with Extensive Debridement, Ankle Arthroscopic Assisted Tibial Bone Spur Resection (R), Peroneal Retinaculum Repair (R), peroneal tendon tenolysis and debridement, application Splint Lower Extremity (R) Operative Note     Date: 2024  OR Location: [facility]    Name: Anahi Bellamy, : 1955, Age: 69 y.o., MRN: [id number], Sex: female    Diagnosis  Pre-op Diagnosis      * Sprain of ligament of right ankle, sequela [S93.401S]     * Arthritis of right ankle [M19.071]     * Recurrent derangement of right ankle [M24.471]     * Peroneal tendinitis of right lower extremity [M76.71]     * Chronic pain of right ankle [M25.571, G89.29] Post-op Diagnosis     * Sprain of ligament of right ankle, sequela [S93.401S]     * Arthritis of right ankle [M19.071]     * Recurrent derangement of right ankle [M24.471]     * Peroneal tendinitis of right lower extremity [M76.71]     * Chronic pain of right ankle [M25.571, G89.29]     Procedures    1.  Right ankle distal tibia bone spur excision CPT 87186  2.  Right ankle secondary ATFL ligament repair CPT 19281  3.  Right ankle arthroscopy with extensive debridement CPT 94125  4.  Right peroneal subluxation repair with peroneal retinaculum reimbrication CPT 95787  5.  Right peroneal tendon tenolysis and debridement CPT 21614  6.  Right leg posterior splint application CPT 76400    Surgeons      * Marcial Perez - Primary    Resident/Fellow/Other Assistant:  Surgeons and Role:  * No surgeons found with a matching role *  1.  Maru Shea D.P.M. PGY 3    Staff:   Circulator: Kishor Sellers Person: Suresh    Anesthesia Staff: Anesthesiologist: Eldon Munoz DO  CRNA: JOSE LUIS Davis-BLUE  SRNA: Anika Laguna    Procedure Summary  Anesthesia: Regional, General  ASA: III  Estimated Blood Loss: 50 mL  Intra-op Medications:   Administrations occurring from 0915 to 1245 on 24:   Medication Name Total Dose    acetaminophen (Ofirmev) injection 1,000 mg   ceFAZolin (Ancef) 1 g 3 g   dexAMETHasone (Decadron) 4 mg/mL 8 mg   fentaNYL PF 0.05 mg/mL 150 mcg   labetalol 5 mg/mL 5 mg   LR infusion Cannot be calculated   lidocaine (Xylocaine) 1 % 5 mL   midazolam (Versed) 1 mg/1 mL 2 mg   ondansetron 2 mg/mL 4 mg   propofol (Diprivan) injection 10 mg/mL 160 mg   rocuronium (ZeMuron) 50 mg/5 mL injection 50 mg   sugammadex (Bridion) 200 mg/2 mL injection 200 mg              Anesthesia Record               Intraprocedure I/O Totals          Intake    acetaminophen 1,000 mg/100 mL (10 mg/mL) 100.00 mL    Total Intake 100 mL       Output    Est. Blood Loss 30 mL    Total Output 30 mL       Net    Net Volume 70 mL          Specimen: No specimens collected              Drains and/or Catheters:   NG/OG/Feeding Tube OG - [place] mouth (Active)       Tourniquet Times:     Total Tourniquet Time Documented:  Thigh (Right) - 124 minutes  Total: Thigh (Right) - 124 minutes      Implants:  Implants       Type Name Action Serial No.       0.5MM X 12CM FLEXBAND PLUS CANNULATED DRILL BIT Implanted [id number]     Implant TISSUE, AMBIENT PLACENTAL, VIAFLOW, 1.0CC - [id number] - [id number] Implanted [id number]              Findings: Severe attenuation of the ATFL ligament.  Positive anterior drawer test intraoperatively.  Subluxation of peroneal tendons prior to repair.  Significant tendinosis and chronic inflammation around the peroneal tendons.  Significant chronic synovitis to the ankle joint.  Prominent distal tibia bone spur which was able to be resected.    Indications: Anahi Bellamy is an 69 y.o. female who is having surgery for Sprain of ligament of right ankle, sequela [S93.401S]  Arthritis of right ankle [M19.071]  Recurrent derangement of right ankle [M24.471]  Peroneal tendinitis of right lower extremity [M76.71]  Chronic pain of right ankle [M25.571, G89.29].     This is a 69-year-old female patient of mine.   She has exhausted conservative measures for the above-mentioned conditions.  I did obtain an MRI which demonstrated chronic attenuation and degenerative changes to her lateral collateral ligaments, peroneal tendinitis.  The MRI also demonstrated some subtalar joint arthritis, posterior ankle joint arthritis and cystic changes-but these did not correlate with the primary areas of her pain.  Her pain was laterally along the ligaments and tendon and associated anterior ankle with a tibia bone spur.  I discussed risks benefits and complications of this procedure.  No guarantees were made or given.  Consent was signed placed in the patient's chart.      Procedure Details:     1.  Right ankle distal tibia bone spur excision CPT 65866  2.  Right ankle secondary ATFL ligament repair CPT 60288  3.  Right ankle arthroscopy with extensive debridement CPT 15892  4.  Right peroneal subluxation repair with peroneal retinaculum reimbrication CPT 54530  5.  Right peroneal tendon tenolysis and debridement CPT 13567  6.  Right leg posterior splint application CPT 35877    Patient was brought back from PACU to the OR and placed on the OR table in a secure supine position.  The above-mentioned anesthesia was administered per the anesthesia team.  A well-padded tourniquet was applied to the right lower extremity.  The patient was positioned and appropriately bumped.  The patient was then cleansed with alcohol wash.  Then a formal sterile prep and drape was performed with chlorhexidine prep.  Surgeon and assistants were scrubbed into the case and gowned and gloved in the normal aseptic sterile technique.  Timeout was performed and all were in agreement according to patient identifiers laterality and surgical site.    Procedure #1  I first insufflated the joint with approximately 20 cc of sterile saline through an anterior medial ankle portal.  I then created a anterior medial ankle portal just medial to the tibialis anterior tendon and  inserted my arthroscopic camera through this portal.  I was able to visualize the ankle joint.  I created a anterior lateral working portal just lateral to the peroneus tertius tendon and inserted a arthroscopic debridement shaver.  On the medial evaluation there was significant chronic synovitis and some adhesive capsulitis.  There is also a very prominent tibial bone spur at the distal tibia.  Upon range of motion this did seem to be impeding maximum dorsiflexion.  My attention was first drawn to the synovitis.  I used a 3.5 mm arthroscopic shaver to debride all of this synovitis to an appropriate level.  There was no longer any adhesive bands or tissue impeding range of motion or catching.  There was no osteochondral defects appreciated within my arthroscopic view.  The medial and lateral gutters were free of significant debris.    Procedure #2  I then aydee my attention to the distal tibia bone spur.  This was very prominent within my arthroscopic view and with maximum dorsiflexion seem to be impeding on range of motion.  I utilized a 4.0 hooded rotary bur and was able to excise and debride a significant portion of the distal tibia bone spur.  This was done under arthroscopic guidance and with careful consideration to the talar cartilage such that this was not damaged.  I debrided enough tibial bone until I was able to move the patient through active range of motion without any bone impingement.  I took arthroscopic pictures for confirmation as well.  At this point we were complete with arthroscopy and we are able to remove equipment and close the 2 portal sites with nylon suture.    Procedure #3  I then aydee my attention to the lateral ankle ligament repair and peroneal tendon work.  I made a approximately 8 cm incision over the distal fibula tip and traversing across the sinus tarsi.  This was a curvilinear incision.  Dissected deep through epidermis and dermal layers into subcutaneous tissue.  I ensured that I  was able to retract all important neurovascular structures.  Electrocautery was utilized to control hemostasis.  I was able to visualize the ATFL ligament and there was hemorrhage within the ligament itself and attenuated.  I performed anterior drawer test and this did not have good stability and there is anterior translation of the foot on the ankle.  I did assess the CFL.  This appeared broad and flattened however with talar tilt testing it did appear to be restricting inversion appropriately such that I did not feel this needed to be repaired.  I then performed procedure #4 and #5.  I drew my attention back to the ligaments.  I transected the ATFL and the lateral capsule.  I was able to visualize the talar neck and distal tip of the fibula.  I created appropriate cuffs at both the talus and fibula.  I then utilized [employer] Artelon synthetic graft to recreate a stable ATFL ligament secondarily.  I first placed my anchor into the talus and then held the foot in a appropriate dorsiflexed and slightly everted position and placed my second anchor into the distal tip of the fibula.  The Artelon graft held well with these anchors.  I performed anterior drawer testing at this point and there was restricted translation of the foot demonstrating an appropriate repair of these ligaments.  I then reimbricated the native lateral capsule tissue in a pants over vest manner to complete the augmented process from repair.  Again when tested this was a very stable repair without significant translation of the foot on the ankle.    Procedure #4  The peroneal retinaculum appeared loose.  I was able to see that there was some subluxation within the peroneal retinaculum with range of motion.  I first incised through the retinaculum.  I visualized the tendons.  There is significant chronic inflammation surrounding the tendons.  There is no longitudinal tearing or excessive flattening of either the peroneus longus or peroneus brevis  tendon.  I did debride a low-lying peroneus brevis muscle belly.  I also debrided chronic inflammatory tissue.  The tendons were then glistening and white without significant synovitis within the retinaculum.    Procedure #5  Because of the attenuation and chronic subluxation of the peroneal retinaculum I planned on augmenting the retinaculum closure with a reimbricated pants over vest stitch.  I was able to bring the posterior tissue up tightly and tighten the peroneal retinaculum in this way.  I was still able to move the peroneal tendons appropriately as they were able to glide within the retinaculum without restriction.  However there is no subluxation following this retinaculum repair.  I then injected 2 cc of [place] via flow amniotic flowable graft into the peroneal retinaculum to prevent adhesions and ensure appropriate gliding of these tendons.    Procedure #6  The surgical sites were then flushed and closed in anatomical layers utilizing Vicryl and nylon suture.  The patient's foot was then cleansed and bandaged with Betadine soaked Adaptic, 4 x 4's, ABDs including 3 ABDs to the posterior heel, and a well-padded posterior splint held in a neutral position.    Patient tolerated procedure well and tolerated anesthesia well.  They are to be discharged per PACU protocol.      Complications:  None; patient tolerated the procedure well.    Disposition: PACU - hemodynamically stable.  Condition: stable                 Additional Details: None    Attending Attestation: I was present and scrubbed for the key portions of the procedure.    Marcial Perez  Phone Number: [phone]

## 2024-11-22 NOTE — ANESTHESIA PROCEDURE NOTES
Airway  Date/Time: 11/22/2024 9:41 AM  Urgency: elective    Airway not difficult    Staffing  Performed: [facility]   Authorized by: Eldon Munoz DO    Performed by: JOSE LUIS Davis-CRNA  Patient location during procedure: OR    Indications and Patient Condition  Indications for airway management: anesthesia  Spontaneous Ventilation: absent  Sedation level: deep  Preoxygenated: yes  Patient position: sniffing  MILS maintained throughout  Mask difficulty assessment: 2 - vent by mask + OA or adjuvant +/- NMBA    Final Airway Details  Final airway type: endotracheal airway      Successful airway: ETT  Cuffed: yes   Successful intubation technique: video laryngoscopy  Facilitating devices/methods: intubating stylet  Endotracheal tube insertion site: oral  Blade: Nick  Blade size: #3  ETT size (mm): 7.5  Cormack-Lehane Classification: grade I - full view of glottis  Placement verified by: chest auscultation and capnometry   Measured from: teeth  ETT to teeth (cm): 21  Number of attempts at approach: 1    Additional Comments  Neck in position of comfort prior to induction/not manipulated during airway management. Able to mask with oral airway without issue. Full view of cords with glidescope

## 2024-11-22 NOTE — ANESTHESIA PREPROCEDURE EVALUATION
Patient: Anahi Bellamy    Procedure Information       Date/Time: 11/22/24 0915    Procedures:       Ankle Secondary Repair Anterior Talofibular Ligament and Calcaneofibular Ligament (Right)      Ankle Arthroscopy with Extensive Debridement, Ankle Arthroscopic Assisted Tibial Bone Spur Resection (Right)      Peroneal Retinaculum Repair (Right)      Application Splint Lower Extremity (Right) - MINI C ARM, Shreya Artelon (2 ligament repair ATFL, CFL), via-flow, 4.0 scope, 3.5 shaver, 4.0 hooded richard, 2-0 fiberwire, popliteal block, adductor canal block    Location: [facility] / [facility]    Surgeons: Marcial Perez DPM            Relevant Problems   Cardiac   (+) Aortic valve sclerosis   (+) Benign essential hypertension   (+) Hyperlipidemia   (+) Nonrheumatic aortic valve stenosis      Neuro   (+) Meralgia paresthetica      GI   (+) Dysphagia   (+) Gastroesophageal reflux disease      Endocrine   (+) Hypothyroidism      Musculoskeletal   (+) Fibromyalgia       Clinical information reviewed:   Tobacco  Allergies  Meds  Problems  Med Hx  Surg Hx  OB Status    Fam Hx  Soc Hx        NPO Detail:  NPO/Void Status  Date of Last Liquid: 11/22/24  Time of Last Liquid: 0700  Date of Last Solid: 11/21/24  Time of Last Solid: 1900  Time of Last Void: 0600         Physical Exam    Airway  Mallampati: III  TM distance: >3 FB     Cardiovascular    Dental    Pulmonary    Abdominal            Anesthesia Plan    History of general anesthesia?: yes  History of complications of general anesthesia?: no    ASA 3     general and regional     intravenous induction   Anesthetic plan and risks discussed with patient.

## 2024-11-22 NOTE — ANESTHESIA POSTPROCEDURE EVALUATION
Patient: Anahi Bellamy    Procedure Summary       Date: 11/22/24 Room / Location: [facility]    Anesthesia Start: 0927 Anesthesia Stop: 1212    Procedures:       Ankle Secondary Repair Anterior Talofibular Ligament and Calcaneofibular Ligament (Right)      Ankle Arthroscopy with Extensive Debridement, Ankle Arthroscopic Assisted Tibial Bone Spur Resection (Right)      Peroneal Retinaculum Repair (Right)      Application Splint Lower Extremity (Right) Diagnosis:       Sprain of ligament of right ankle, sequela      Arthritis of right ankle      Recurrent derangement of right ankle      Peroneal tendinitis of right lower extremity      Chronic pain of right ankle      (Sprain of ligament of right ankle, sequela [S93.401S])      (Arthritis of right ankle [M19.071])      (Recurrent derangement of right ankle [M24.471])      (Peroneal tendinitis of right lower extremity [M76.71])      (Chronic pain of right ankle [M25.571, G89.29])    Surgeons: Marcial Perez DPM Responsible Provider: Eldon Munoz DO    Anesthesia Type: general, regional ASA Status: 3            Anesthesia Type: general, regional    Vitals Value Taken Time   /59 11/22/24 1306   Temp 36.3 °C (97.3 °F) 11/22/24 1207   Pulse 64 11/22/24 1307   Resp 10 11/22/24 1307   SpO2 94 % 11/22/24 1307   Vitals shown include unfiled device data.    Anesthesia Post Evaluation    Patient location during evaluation: bedside  Patient participation: complete - patient participated  Level of consciousness: awake  Pain management: adequate  Airway patency: patent  Cardiovascular status: acceptable  Respiratory status: acceptable  Hydration status: acceptable  Postoperative Nausea and Vomiting: none        There were no known notable events for this encounter.

## 2024-11-22 NOTE — DISCHARGE INSTRUCTIONS
PODIATRIC SURGERY POST-OP INSTRUCTIONS    POST-OP INSTRUCTIONS  You must have a responsible adult drive you home and stay with you for the first 24 hours.    Do not drive a car or drink any alcohol for 24 hours after surgery.  You may have mild nausea, a sore throat or raspy voice for a few days.    Keep dressing clean, dry and intact until your first post-operative appointment.  Do not shower unless using a cast protector to protect dressing.  If dressing gets wet, please call office immediately as this can lead to increased risk of infection or wound dehiscence.   Elevate surgical extremity as much as possible to help with pain and swelling.  Place ice pack behind knee of surgical extremity.  Please remain non-weight bearing to the surgical extremity with splint in place utilizing crutches, wheelchair, knee scooter.  You were prescribed Vicodin for pain; please take as directed on the label.  You are prescribed Flexeril for muscle relaxer.  Please take as directed on the label.  You were prescribed aspirin for blood clot prevention.  Please take as directed on the label.  Please call to schedule, or to confirm a previously scheduled, post-operative appointment with Dr. Perez.  Should any problems, questions, or concerns arise, please call the clinic office.    WHEN TO CALL YOUR DOCTOR:  Fever (>100.4°F or 38.0°C) or chills.  Incision problems such as redness, warmth, swelling, or foul smelling drainage.  Severe nausea or persistent vomiting.  Pain and swelling in your legs, especially if it is only on one side and not the other.  Pain with urination, cloudy urine, or foul smelling urine.  Or if you have any other problems or questions.  CALL 911 or go to the ED if you have any shortness of breath, difficulty breathing, or chest pain.    Marcial Perez DPM  [facility]  [address]  [place] [id number] [phone]

## 2024-11-22 NOTE — POST-PROCEDURE NOTE
Pt arrived to [facility] via cart  daughter called back to pts room.  Gingerale and cookies taken well.  Ice bag sent home with pt  . Pt has walker for home

## 2024-12-18 ENCOUNTER — APPOINTMENT (OUTPATIENT)
Age: 69
End: 2024-12-18
Payer: MEDICARE

## 2025-01-22 DIAGNOSIS — E03.9 ACQUIRED HYPOTHYROIDISM: ICD-10-CM

## 2025-01-22 RX ORDER — LEVOTHYROXINE SODIUM 100 UG/1
TABLET ORAL
Qty: 90 TABLET | Refills: 3 | Status: SHIPPED | OUTPATIENT
Start: 2025-01-22

## 2025-01-27 ENCOUNTER — APPOINTMENT (OUTPATIENT)
Dept: SLEEP MEDICINE | Facility: CLINIC | Age: 70
End: 2025-01-27
Payer: COMMERCIAL

## 2025-01-27 ENCOUNTER — CLINICAL SUPPORT (OUTPATIENT)
Dept: AUDIOLOGY | Facility: CLINIC | Age: 70
End: 2025-01-27
Payer: MEDICARE

## 2025-01-27 VITALS
DIASTOLIC BLOOD PRESSURE: 84 MMHG | WEIGHT: 292 LBS | SYSTOLIC BLOOD PRESSURE: 136 MMHG | OXYGEN SATURATION: 96 % | BODY MASS INDEX: 48.65 KG/M2 | HEART RATE: 74 BPM | HEIGHT: 65 IN

## 2025-01-27 DIAGNOSIS — G47.33 OSA ON CPAP: Primary | ICD-10-CM

## 2025-01-27 PROCEDURE — 1036F TOBACCO NON-USER: CPT | Performed by: INTERNAL MEDICINE

## 2025-01-27 PROCEDURE — 3079F DIAST BP 80-89 MM HG: CPT | Performed by: INTERNAL MEDICINE

## 2025-01-27 PROCEDURE — 99213 OFFICE O/P EST LOW 20 MIN: CPT | Performed by: INTERNAL MEDICINE

## 2025-01-27 PROCEDURE — 3075F SYST BP GE 130 - 139MM HG: CPT | Performed by: INTERNAL MEDICINE

## 2025-01-27 PROCEDURE — 1126F AMNT PAIN NOTED NONE PRSNT: CPT | Performed by: INTERNAL MEDICINE

## 2025-01-27 PROCEDURE — 1159F MED LIST DOCD IN RCRD: CPT | Performed by: INTERNAL MEDICINE

## 2025-01-27 PROCEDURE — G2211 COMPLEX E/M VISIT ADD ON: HCPCS | Performed by: INTERNAL MEDICINE

## 2025-01-27 PROCEDURE — 1123F ACP DISCUSS/DSCN MKR DOCD: CPT | Performed by: INTERNAL MEDICINE

## 2025-01-27 PROCEDURE — 3008F BODY MASS INDEX DOCD: CPT | Performed by: INTERNAL MEDICINE

## 2025-01-27 ASSESSMENT — SLEEP AND FATIGUE QUESTIONNAIRES
HOW LIKELY ARE YOU TO NOD OFF OR FALL ASLEEP WHILE LYING DOWN TO REST IN THE AFTERNOON WHEN CIRCUMSTANCES PERMIT: SLIGHT CHANCE OF DOZING
HOW LIKELY ARE YOU TO NOD OFF OR FALL ASLEEP WHILE SITTING QUIETLY AFTER LUNCH WITHOUT ALCOHOL: WOULD NEVER DOZE
HOW LIKELY ARE YOU TO NOD OFF OR FALL ASLEEP IN A CAR, WHILE STOPPED FOR A FEW MINUTES IN TRAFFIC: WOULD NEVER DOZE
HOW LIKELY ARE YOU TO NOD OFF OR FALL ASLEEP WHILE SITTING AND READING: SLIGHT CHANCE OF DOZING
HOW LIKELY ARE YOU TO NOD OFF OR FALL ASLEEP WHEN YOU ARE A PASSENGER IN A CAR FOR AN HOUR WITHOUT A BREAK: WOULD NEVER DOZE
HOW LIKELY ARE YOU TO NOD OFF OR FALL ASLEEP WHILE WATCHING TV: SLIGHT CHANCE OF DOZING
SITING INACTIVE IN A PUBLIC PLACE LIKE A CLASS ROOM OR A MOVIE THEATER: WOULD NEVER DOZE
HOW LIKELY ARE YOU TO NOD OFF OR FALL ASLEEP WHILE SITTING AND TALKING TO SOMEONE: WOULD NEVER DOZE
ESS-CHAD TOTAL SCORE: 3

## 2025-01-27 ASSESSMENT — PAIN SCALES - GENERAL: PAINLEVEL_OUTOF10: 0-NO PAIN

## 2025-01-27 NOTE — ASSESSMENT & PLAN NOTE
- Anahi Bellamy  has sleep apnea and requires treatment.  - Anahi Bellamy demonstrates good compliance and benefit from PAP therapy  - Continue Auto PAP 4-20 cmH20   - Order for renewal of PAP supplies placed through [facility]    - Follow-up in 12 months

## 2025-01-27 NOTE — PROGRESS NOTES
AUDIOLOGY ADULT HEARING AID  REPAIR    Name:  Anahi Bellamy  :  1955  Age:  69 y.o.  Date of Service:  2025    Reason for visit: Ms. Bellamy is seen in the clinic today by audiology assistant for a hearing aid repair appointment. Patient complains that her  left hearing aid  broke.    Hearing aid repair:  Hearing aid left  was replaced. Hearing aids are out of warranty so patient paid $120.00 for repair. Microphones on repaired hearing aid were brushed and vacuumed; domes and retention tails were replaced as well. Post-repair listening check was good; patient was satisfied with sound.     *Both hearing aids were changed to 5.0 receivers as right hearing aid is under repair warranty.

## 2025-01-27 NOTE — PROGRESS NOTES
"     Patient: Anahi Bellamy    [id number]  : 1955 -- AGE 69 y.o.    Provider: Jose Romero MD     Location [facility]   Service Date: 2025              [facility]  Followup Visit Note    Subjective   Patient ID: Anahi Bellamy is a 69 y.o. female who presents for Sleep Apnea, Pap Adherence Followup, and Needs Pap Supplies/new Pap Machine.  HPI    Current Sleep History:  Anahi presents for follow-up on the management of her sleep apnea which is currently being managed with positive airway pressure therapy.   Here for initial visit after REPAP    A downloaded compliance report was reviewed and was interpreted by myself as follows:  > 4 hour compliance was 100 %, with an average use of 6 hours and 25 minutes, with a residual AHI 2.1 on AutoPAP 4-20 cmH2O .   She is using a heated hose.    Anahi Bellamy reports good benefit from her device.  She denies daytime sleepiness  She had surgery a few months back. She had ankle surgery and is in a walking boot and starting therapy in February  ESS: 3     Review of Systems  Review of systems negative except as per HPI  Objective   /84   Pulse 74   Ht 1.651 m (5' 5\")   Wt 132 kg (292 lb)   SpO2 96%   BMI 48.59 kg/m²    PREVIOUS WEIGHTS:  Wt Readings from Last 3 Encounters:   25 132 kg (292 lb)   24 129 kg (285 lb)   24 131 kg (289 lb)       Physical Exam  PHYSICAL EXAM: GENERAL: alert pleasant and cooperative no acute distress  PSYCH EXAM: alert,oriented, in NAD with a full range of affect, normal behavior and no psychotic features    No results found for: \"IRON\", \"TIBC\", \"FERRITIN\"     Assessment/Plan   Problem List Items Addressed This Visit             ICD-10-CM    ALPHONSO on CPAP - Primary G47.33     - Anahi Bellamy  has sleep apnea and requires treatment.  - Anahi Bellamy demonstrates good compliance and benefit from PAP therapy  - Continue Auto PAP 4-20 cmH20   - Order " for renewal of PAP supplies placed through [facility]    - Follow-up in 12 months          Relevant Orders    Positive Airway Pressure (PAP) Therapy

## 2025-02-03 NOTE — PROGRESS NOTES
Physical Therapy Evaluation    Patient Name: Anahi Bellamy  MRN: [id number]  Evaluation Date: 2/4/2025  Time Calculation  Start Time: 1420  Stop Time: 1505  Time Calculation (min): 45 min  PT Evaluation Time Entry  PT Evaluation (Moderate) Time Entry: 25     PT Therapeutic Procedures Time Entry  Therapeutic Exercise Time Entry: 12     Insurance Information: MEDICARE A/B, ANTHEM 50V PT/OT/ST, NO AUTH, ( $0 USED PT/ST)   Problem List Items Addressed This Visit    None  Visit Diagnoses         Codes    Chronic or recurrent subluxation of peroneal tendon of right foot    -  Primary M24.471    Relevant Orders    Follow Up In Physical Therapy    Peroneal tendinitis of right lower extremity     M76.71    Sprain of ligament of right ankle, sequela     S93.401S    Chronic pain of right ankle     M25.571, G89.29    Degenerative joint disease of ankle and foot, right     M19.071              Subjective   General:  Patient is a 68 yo female with diagnosis of  s/p 11/22/24 right ankle distal tib bone spur excision, right ankle ATFL ligament repair, right ankle arthroscopy with extensive debridement, right peroneal subluxation repair with peroneal retinaculum reimbrication and right peroneal tenolysis and debridement.  Patient was seen pre-operatively; failed conservative management.  Patient initially NWB- only permitted transfers.   Patient was ambulating with walker and boot WBAT with rollator.  Patient has since transitioned to ankle brace/normal shoe- x 2 weeks of progressive boot weaning.  Patient presently shoe and brace only. Patient has recently increased activity with resulting increase in pain.  Patient is motivated to participate         Surgery:   DOS: 11/22/2024  S/P:   1. Right ankle distal tibia bone spur excision   2. Right ankle secondary ATFL ligament repair   3. Right ankle arthroscopy with extensive debridement   4. Right peroneal subluxation repair with peroneal retinaculum reimbrication   5. Right  peroneal tendon tenolysis and debridement     Precautions:  CAD  HTN       Relevant PMH:  Ankylosing spondylitis (HCC)   Aortic stenosis   AV block   Carotid artery disease (HCC)   Diverticulitis   Dysphagia   Fatty liver disease, nonalcoholic   GERD (gastroesophageal reflux disease)   Hearing loss   Hypothyroid   Neuropathy   Primary generalized (osteo)arthritis   Sleep apnea   Bilateral TKA  X2 TKA revisions  Cervical/lumbar fusions  Left ankle surgery   HTN  CAD  Right RC surgery      Pain:  Right ankle  At worst  8/10  Worse with   Activity/distance walking    At best  /10  Better with  rest     Home Living:  Home type: House  Stairs: Yes  Lives with: Spouse  Occupation: retired  Hobbies/activities: cares for grandchildren    Prior Function Per Pt/Caregiver Report:  Limited secondary to pain       Imaging:  None recent    Objective   Posture:  Flexed trunk posture     Range of Motion:  Ankle AROM L R   Dorsiflexion WFL deg neutral deg   Plantarflexion WFL deg 35 deg   Eversion WFL deg 7 deg   Inversion WFL deg 12 deg         Strength:  Ankle MMT L R   Dorsiflexion 5/5 4-/5   Plantarflexion 5/5 4-/5   Eversion 5/5 4-/5   Inversion 5/5 4-/5         Flexibility:  ++ GS tightness    Palpation:  ++tenderness lateral ankle tendons/ligaments    Circumferential mm  30 cm at malleoli    Incisions well healed       Gait:  With rollator/brace on ankle  WBAT      Outcome Measures:  LEFS  21/80      Assessment  Pt is a 69 y.o. female who presents with impairments of right ankle pain/weakness/stiffness. These impairments have led to functional limitations including impaired functional mobility. Pt would benefit from skilled physical therapy intervention to improve above impairments and facilitate return to function.    Complexity of Evaluation: Low    Based on the history including personal factors and/or comorbidities, examination of body systems including body structures and function, activity limitations, and/or  participation restrictions, as well as clinical presentation, patient meets criteria for above complexity evaluation.    Plan  1-2/day  Up to 16 visits  Therapeutic exercise  Manual  Gait training         Insurance Plan: Payor: [employer] / Plan: [employer] HMP / Product Type: *No Product type* /     Plan for next visit:   Advance therapeutic exercise as appropriate  Add manual as appropriate    OP EDUCATION:  HEP       Today's Treatment:  Therapeutic Exercise  HEP to be completed daily, exercises include:  Ankle alphabet  Heel cord stretch  Towel scrunches  LAQ  Seated hip ABD mint    Goals:  STG (3 visits)   Patient to be independent with HEP    LTG (16 visits)   LEFS to < 35% impaired   Patient to ambulate with least restrictive device community distances without pain   AROM right ankle = to left without pain   MMT right ankle  = to left

## 2025-02-04 ENCOUNTER — EVALUATION (OUTPATIENT)
Dept: PHYSICAL THERAPY | Facility: CLINIC | Age: 70
End: 2025-02-04
Payer: COMMERCIAL

## 2025-02-04 DIAGNOSIS — M25.571 CHRONIC PAIN OF RIGHT ANKLE: ICD-10-CM

## 2025-02-04 DIAGNOSIS — G89.29 CHRONIC PAIN OF RIGHT ANKLE: ICD-10-CM

## 2025-02-04 DIAGNOSIS — S93.401S SPRAIN OF LIGAMENT OF RIGHT ANKLE, SEQUELA: ICD-10-CM

## 2025-02-04 DIAGNOSIS — M76.71 PERONEAL TENDINITIS OF RIGHT LOWER EXTREMITY: ICD-10-CM

## 2025-02-04 DIAGNOSIS — M24.471 CHRONIC OR RECURRENT SUBLUXATION OF PERONEAL TENDON OF RIGHT FOOT: Primary | ICD-10-CM

## 2025-02-04 DIAGNOSIS — M19.071 DEGENERATIVE JOINT DISEASE OF ANKLE AND FOOT, RIGHT: ICD-10-CM

## 2025-02-04 PROCEDURE — 97110 THERAPEUTIC EXERCISES: CPT | Mod: GP

## 2025-02-04 PROCEDURE — 97162 PT EVAL MOD COMPLEX 30 MIN: CPT | Mod: GP

## 2025-02-06 NOTE — PROGRESS NOTES
Physical Therapy Treatment    Patient Name: Anahi Bellamy  MRN: [id number]  Encounter date:  2/7/2025  Time Calculation  Start Time: 1350  Stop Time: 1430  Time Calculation (min): 40 min     PT Therapeutic Procedures Time Entry  Manual Therapy Time Entry: 10  Therapeutic Exercise Time Entry: 25     Insurance Information:  Visit Number:  2 (including evaluation)  Planned total visits: 16  Visits Authorized/Insurance Coverage:  2025: MEDICARE A/B, ANTHEM 50V PT/OT/ST, NO AUTH, ( $0 USED PT/ST)     Current Problem  Problem List Items Addressed This Visit    None  Visit Diagnoses         Codes    Chronic or recurrent subluxation of peroneal tendon of right foot    -  Primary M24.471            Surgery:   DOS: 11/22/2024  S/P:   1. Right ankle distal tibia bone spur excision   2. Right ankle secondary ATFL ligament repair   3. Right ankle arthroscopy with extensive debridement   4. Right peroneal subluxation repair with peroneal retinaculum reimbrication   5. Right peroneal tendon tenolysis and debridement      Precautions:  CAD  HTN  Per Dr. Perez 2/4/25 Hi. Her surgery was 11/22/2024. So roughly 10 weeks out. I am OK with full weight-bearing with ankle brace. Would be OK with discontinuation of ankle brace at week 14 as she feels comfortable. No other set restrictions.     Relevant PMH:  Ankylosing spondylitis (HCC)   Aortic stenosis   AV block   Carotid artery disease (HCC)   Diverticulitis   Dysphagia   Fatty liver disease, nonalcoholic   GERD (gastroesophageal reflux disease)   Hearing loss   Hypothyroid   Neuropathy   Primary generalized (osteo)arthritis   Sleep apnea   Bilateral TKA  X2 TKA revisions  Cervical/lumbar fusions  Left ankle surgery   HTN  CAD  Right RC surgery      Pain  Up to 5/10  Right ankle        Subjective  General  Patient reports BID compliance with HEP without issues.  Patient reports soreness greatest with prolonged standing and walk    Objective  Modest tenderness      Treatment:  Manual   STM right ankle/lower leg    Therapeutic Exercise  Supine GS stretch  AROM   DF/PF x 20  INV/EV x 20    Seated  LAQ bilateral x 20  Hip iso ADD x 20  Hip ABD mint x 20  Toe curls x 20  RB AP x 20      Current HEP:  HEP to be completed daily, exercises include:  Ankle alphabet  Heel cord stretch  Towel scrunches  LAQ  Seated hip ABD mint    Has patient been compliant with HEP? Yes    Activity tolerance:  good    OP EDUCATION:  HEP    Assessment:  Pt's response to treatment:  good  Areas of improvements:  exerciser tolerance  Limitations/deficits:  pain with prolonged WB    Pain end of session:   No gross changes    Plan:  Continue with current POC with the following changes add resistance as tolerated to ankle PF/DF/INV/EV; weight to LAQ; progress to standing exercise when pain decreases in WB    Assessment of current progress against goals:  Progressing toward functional goals    Goals:    STG (3 visits)   Patient to be independent with HEP     LTG (16 visits)   LEFS to < 35% impaired   Patient to ambulate with least restrictive device community distances without pain   AROM right ankle = to left without pain   MMT right ankle  = to left

## 2025-02-07 ENCOUNTER — TREATMENT (OUTPATIENT)
Dept: PHYSICAL THERAPY | Facility: CLINIC | Age: 70
End: 2025-02-07
Payer: COMMERCIAL

## 2025-02-07 DIAGNOSIS — M24.471 CHRONIC OR RECURRENT SUBLUXATION OF PERONEAL TENDON OF RIGHT FOOT: Primary | ICD-10-CM

## 2025-02-07 PROCEDURE — 97110 THERAPEUTIC EXERCISES: CPT | Mod: GP

## 2025-02-07 PROCEDURE — 97140 MANUAL THERAPY 1/> REGIONS: CPT | Mod: GP

## 2025-02-11 ENCOUNTER — TREATMENT (OUTPATIENT)
Dept: PHYSICAL THERAPY | Facility: CLINIC | Age: 70
End: 2025-02-11
Payer: COMMERCIAL

## 2025-02-11 DIAGNOSIS — M24.471 CHRONIC OR RECURRENT SUBLUXATION OF PERONEAL TENDON OF RIGHT FOOT: ICD-10-CM

## 2025-02-11 PROCEDURE — 97110 THERAPEUTIC EXERCISES: CPT | Mod: GP,CQ

## 2025-02-11 PROCEDURE — 97140 MANUAL THERAPY 1/> REGIONS: CPT | Mod: GP,CQ

## 2025-02-11 NOTE — PROGRESS NOTES
"    Physical Therapy Treatment    Patient Name: Anahi Bellamy  MRN: [id number]  Encounter date:  2/11/2025  Time Calculation  Start Time: 1000  Stop Time: 1045  Time Calculation (min): 45 min     PT Therapeutic Procedures Time Entry  Manual Therapy Time Entry: 15  Therapeutic Exercise Time Entry: 25     Insurance Information:  Visit Number:  3 (including evaluation)  Planned total visits: 16  Visits Authorized/Insurance Coverage:  2025: MEDICARE A/B, ANTHEM 50V PT/OT/ST, NO AUTH, ( $0 USED PT/ST)     Current Problem  Problem List Items Addressed This Visit    None  Visit Diagnoses         Codes    Chronic or recurrent subluxation of peroneal tendon of right foot     M24.471          Surgery:   DOS: 11/22/2024  S/P:   1. Right ankle distal tibia bone spur excision   2. Right ankle secondary ATFL ligament repair   3. Right ankle arthroscopy with extensive debridement   4. Right peroneal subluxation repair with peroneal retinaculum reimbrication   5. Right peroneal tendon tenolysis and debridement     11 weeks post op 2/11/25      Precautions:  CAD  HTN  Per Dr. Perez 2/4/25 Hi. Her surgery was 11/22/2024. So roughly 10 weeks out. I am OK with full weight-bearing with ankle brace. Would be OK with discontinuation of ankle brace at week 14 as she feels comfortable. No other set restrictions.     Relevant PMH:  Ankylosing spondylitis (HCC)   Aortic stenosis   AV block   Carotid artery disease (HCC)   Diverticulitis   Dysphagia   Fatty liver disease, nonalcoholic   GERD (gastroesophageal reflux disease)   Hearing loss   Hypothyroid   Neuropathy   Primary generalized (osteo)arthritis   Sleep apnea   Bilateral TKA  X2 TKA revisions  Cervical/lumbar fusions  Left ankle surgery   HTN  CAD  Right RC surgery    Pain  4/10   Right ankle    Subjective  General  \"It was a long walk in and it hurt a lot but it is settling down now.\"     Objective  The patient presents with rollator  Myofascial restriction medial incision " "    Treatment:  Therapeutic Exercise  Ankle brace donned  Rail ~ 3 ft   -lateral walking x 2 laps   - weight shift fwd x 10 ea    Seated  LAQ bilateral 3\" x 10     Rail ~ 3 ft   -retro walking x 2 laps   -lateral weight shift 2-3 sec x 10 ea     Seated with brace doffed right foot  -arch 2x5   -toe toga x 10 ea   -red marybel disc 2x5 -  Fwd  Retro  Inv  Ev    Manual   Long sit   STM right ankle  Metatarsal splaying  Metatarsal flex/ext  Calf stretch   Tibialis anterior stretch  Next session - Scar message without lotion      OP education  Scar message without lotion      Current HEP:  HEP to be completed daily, exercises include:  Ankle alphabet  Heel cord stretch  Towel scrunches  LAQ  Seated hip ABD mint    Has patient been compliant with HEP? Yes    Activity tolerance:  good    OP EDUCATION:  HEP    Assessment:     Pt's response to treatment: Good   Areas of improvements:  Decreased pain  Limitations/deficits:  Weakness, Unstable, and myofascial restriction     Pain end of session:  2 post manual in seated, 0 with brace donned and walking.       Plan:  Continue with current POC with the following changes add resistance as tolerated to ankle PF/DF/INV/EV; weight to LAQ; progress to standing exercise when pain decreases in WB    Assessment of current progress against goals:  Progressing toward functional goals    Goals:    STG (3 visits)   Patient to be independent with HEP     LTG (16 visits)   LEFS to < 35% impaired   Patient to ambulate with least restrictive device community distances without pain   AROM right ankle = to left without pain   MMT right ankle  = to left     "

## 2025-02-13 ENCOUNTER — TREATMENT (OUTPATIENT)
Dept: PHYSICAL THERAPY | Facility: CLINIC | Age: 70
End: 2025-02-13
Payer: COMMERCIAL

## 2025-02-13 DIAGNOSIS — M24.471 CHRONIC OR RECURRENT SUBLUXATION OF PERONEAL TENDON OF RIGHT FOOT: ICD-10-CM

## 2025-02-13 PROCEDURE — 97110 THERAPEUTIC EXERCISES: CPT | Mod: GP,CQ

## 2025-02-13 NOTE — PROGRESS NOTES
"    Physical Therapy Treatment    Patient Name: Anahi Bellamy  MRN: [id number]  Encounter date:  2/13/2025  Time Calculation  Start Time: 1000  Stop Time: 1045  Time Calculation (min): 45 min     PT Therapeutic Procedures Time Entry  Therapeutic Exercise Time Entry: 40     Insurance Information:  Visit Number:  4 (including evaluation)  Planned total visits: 16  Visits Authorized/Insurance Coverage:  2025: MEDICARE A/B, ANTHEM 50V PT/OT/ST, NO AUTH, ( $0 USED PT/ST)     Current Problem  Problem List Items Addressed This Visit    None  Visit Diagnoses         Codes    Chronic or recurrent subluxation of peroneal tendon of right foot     M24.471          Surgery:   DOS: 11/22/2024  S/P:   1. Right ankle distal tibia bone spur excision   2. Right ankle secondary ATFL ligament repair   3. Right ankle arthroscopy with extensive debridement   4. Right peroneal subluxation repair with peroneal retinaculum reimbrication   5. Right peroneal tendon tenolysis and debridement     11 weeks post op 2/13/25      Precautions:  CAD  HTN  Per Dr. Perez 2/4/25 Hi. Her surgery was 11/22/2024. So roughly 10 weeks out. I am OK with full weight-bearing with ankle brace. Would be OK with discontinuation of ankle brace at week 14 as she feels comfortable. No other set restrictions.     Relevant PMH:  Ankylosing spondylitis (HCC)   Aortic stenosis   AV block   Carotid artery disease (HCC)   Diverticulitis   Dysphagia   Fatty liver disease, nonalcoholic   GERD (gastroesophageal reflux disease)   Hearing loss   Hypothyroid   Neuropathy   Primary generalized (osteo)arthritis   Sleep apnea   Bilateral TKA  X2 TKA revisions  Cervical/lumbar fusions  Left ankle surgery   HTN  CAD  Right RC surgery    Pain  Heel and medial and arch 6/10   Right ankle    Subjective  General   \"I am having a hard time walking.\"     Objective  Myofacial restrictions palpated with scar message lateral right ankle.     Treatment:  Therapeutic Exercise  Ankle brace " "donned  Rail ~ 3 ft - -lateral walking x 2 laps   Seated LAQ bilateral 3\" x 10 ea   Rail ~ 3 ft -  fwd walking with heel strike x 10 ea  Seated iso hip adduction 3\" x 10   Rail ~ 3 ft - -retro walking x 2 laps       Seated with brace doffed right foot  - arch 2x5   - toe toga x 10 ea   -red marybel disc 2x5 -  Fwd  Retro  Inv  Ev    Manual   Long sit -supported    Scar message without deep prep   STM right ankle  Metatarsal splaying  Metatarsal flex/ext  Calf stretch   Tibialis anterior stretch    OP education  Scar message without lotion      Current HEP:  HEP to be completed daily, exercises include:  Ankle alphabet  Heel cord stretch  Towel scrunches  LAQ  Seated hip ABD mint    Has patient been compliant with HEP? Yes    Activity tolerance:  good    OP EDUCATION:  HEP    Assessment:   Patient sore after last session. Maintain same plan to allow patient to acclimate to ROM and WB exercises.   Pt's response to treatment: Fair     Limitations/deficits:  ROM limited and affects function and Difficult to walk due to pain.    Pain end of session:  \"It is feeling pretty good now.\" 2/10       Plan:  Continue with current POC with the following changes add resistance as tolerated to ankle PF/DF/INV/EV; weight to LAQ; progress to standing exercise when pain decreases in WB    Assessment of current progress against goals:  Progressing toward functional goals    Goals:    STG (3 visits)   Patient to be independent with HEP     LTG (16 visits)   LEFS to < 35% impaired   Patient to ambulate with least restrictive device community distances without pain   AROM right ankle = to left without pain   MMT right ankle  = to left     "

## 2025-02-18 ENCOUNTER — TREATMENT (OUTPATIENT)
Dept: PHYSICAL THERAPY | Facility: CLINIC | Age: 70
End: 2025-02-18
Payer: MEDICARE

## 2025-02-18 DIAGNOSIS — M24.471 CHRONIC OR RECURRENT SUBLUXATION OF PERONEAL TENDON OF RIGHT FOOT: ICD-10-CM

## 2025-02-18 PROCEDURE — 97140 MANUAL THERAPY 1/> REGIONS: CPT | Mod: GP,CQ

## 2025-02-18 PROCEDURE — 97110 THERAPEUTIC EXERCISES: CPT | Mod: GP,CQ

## 2025-02-18 NOTE — PROGRESS NOTES
Physical Therapy Treatment    Patient Name: Anahi Bellamy  MRN: [id number]  Encounter date:  2/20/2025  Time Calculation  Start Time: 1403  Stop Time: 1444  Time Calculation (min): 41 min     PT Therapeutic Procedures Time Entry  Manual Therapy Time Entry: 10  Therapeutic Exercise Time Entry: 30     Insurance Information:  Visit Number:  6 (including evaluation)  Planned total visits: 16  Visits Authorized/Insurance Coverage:  2025: MEDICARE A/B, ANTHEM 50V PT/OT/ST, NO AUTH, ( $0 USED PT/ST)     Current Problem  Problem List Items Addressed This Visit    None    Problem List Items Addressed This Visit    None  Visit Diagnoses           Codes     Chronic or recurrent subluxation of peroneal tendon of right foot     M24.471      Surgery:   DOS: 11/22/2024  S/P:   1. Right ankle distal tibia bone spur excision   2. Right ankle secondary ATFL ligament repair   3. Right ankle arthroscopy with extensive debridement   4. Right peroneal subluxation repair with peroneal retinaculum reimbrication   5. Right peroneal tendon tenolysis and debridement     11 weeks post op 2/13/25      Precautions:  CAD  HTN  Per Dr. Perez 2/4/25 Hi. Her surgery was 11/22/2024. So roughly 10 weeks out. I am OK with full weight-bearing with ankle brace. Would be OK with discontinuation of ankle brace at week 14 as she feels comfortable. No other set restrictions.     Relevant PMH:  Ankylosing spondylitis (HCC)   Aortic stenosis   AV block   Carotid artery disease (HCC)   Diverticulitis   Dysphagia   Fatty liver disease, nonalcoholic   GERD (gastroesophageal reflux disease)   Hearing loss   Hypothyroid   Neuropathy   Primary generalized (osteo)arthritis   Sleep apnea   Bilateral TKA  X2 TKA revisions  Cervical/lumbar fusions  Left ankle surgery   HTN  CAD  Right RC surgery    Pain  Heel and medial and arch 5/10   Right ankle     Subjective  General  Pt reports increased R ankle soreness, noting she states she has been more active in  "cooking, house work and,  responsibilities requiring increased standing, noting that can lead to her walking with more of a limp as the day has progresses. Patient states she was able to complete the HEP, but has increased pain due to decreased time elevating her RLE.      Objective  Pt ambulates into and out of clinic with use of rollator, mild forward bent posture, ankle brace donned. UE support     Treatment:  Therapeutic Exercise  Ankle brace donned  // bars ~ 10 ft - -lateral walking x 2 laps   Seated LAQ bilateral 1#  3\" 2 x 10 ea   // bars~ 8 ft -  fwd walking with heel strike, x 6 (3 laps)  Seated iso hip adduction 5\" hold x 15   // bars- retro walking x 2 laps     4 yellow hurdles,step to pattern x 2 laps     Seated with brace doffed right foot  - arch 2x5 HEP  - toe toga x 10 ea HEP  -red marybel disc x 10 -  Fwd  Retro  Inv  Ev    Manual   Long sit -supported    Scar massage, edema massage, gentle Gr I-II posterior talar glides with active ankle DF x 10 reps    OP education    Current HEP:  HEP to be completed daily, exercises include:  Ankle alphabet  Heel cord stretch  Towel scrunches  LAQ  Seated hip ABD mint    Has patient been compliant with HEP? Yes    Activity tolerance:  Good, slowly improving standing tolerance to cook (current tolerance at 5 minutes per pt)  OP EDUCATION:  Rationale of edema massage.     Assessment:  Patient had fair response to therex, decreased pain at end of appointment. Effort during therex, especially with walking laps in // bars, appeared labored. Verbal and/or tactile cues given for proper therex performance and to improve seated and standing posture as needed. Decreased posterior and lateral R ankle pain following treatment, pt anticipated increased soreness later in the day.    Pt's response to treatment: Fair     Limitations/deficits:  ROM limited and affects function and Difficult to walk due to pain.    Pain end of session: 2 of 10;  sore     Plan:  Continue " with current POC with the following changes consider band resisted 4 way ankle    Assessment of current progress against goals:  Progressing toward functional goals    Goals:    STG (3 visits)   Patient to be independent with HEP     LTG (16 visits)   LEFS to < 35% impaired   Patient to ambulate with least restrictive device community distances without pain   AROM right ankle = to left without pain   MMT right ankle  = to left

## 2025-02-18 NOTE — PROGRESS NOTES
Physical Therapy Treatment    Patient Name: Anahi Bellamy  MRN: [id number]  Encounter date:  2/18/2025  Time Calculation  Start Time: 1400  Stop Time: 1445  Time Calculation (min): 45 min     PT Therapeutic Procedures Time Entry  Manual Therapy Time Entry: 10  Therapeutic Exercise Time Entry: 35     Insurance Information:  Visit Number:  5 (including evaluation)  Planned total visits: 16  Visits Authorized/Insurance Coverage:  2025: MEDICARE A/B, ANTHEM 50V PT/OT/ST, NO AUTH, ( $0 USED PT/ST)     Current Problem  Problem List Items Addressed This Visit    None  Visit Diagnoses         Codes    Chronic or recurrent subluxation of peroneal tendon of right foot     M24.471            Surgery:   DOS: 11/22/2024  S/P:   1. Right ankle distal tibia bone spur excision   2. Right ankle secondary ATFL ligament repair   3. Right ankle arthroscopy with extensive debridement   4. Right peroneal subluxation repair with peroneal retinaculum reimbrication   5. Right peroneal tendon tenolysis and debridement     11 weeks post op 2/13/25      Precautions:  CAD  HTN  Per Dr. Perez 2/4/25 Hi. Her surgery was 11/22/2024. So roughly 10 weeks out. I am OK with full weight-bearing with ankle brace. Would be OK with discontinuation of ankle brace at week 14 as she feels comfortable. No other set restrictions.     Relevant PMH:  Ankylosing spondylitis (HCC)   Aortic stenosis   AV block   Carotid artery disease (HCC)   Diverticulitis   Dysphagia   Fatty liver disease, nonalcoholic   GERD (gastroesophageal reflux disease)   Hearing loss   Hypothyroid   Neuropathy   Primary generalized (osteo)arthritis   Sleep apnea   Bilateral TKA  X2 TKA revisions  Cervical/lumbar fusions  Left ankle surgery   HTN  CAD  Right RC surgery    Pain  Heel and medial and arch 2/10   Right ankle    Subjective  General   Pt reports good tolerance to last session, experiencing transient muscle soreness and fatigue.  Pt reports compliance with HEP  "performance    Objective  Pt ambulates into clinic with use of rollator, mild trunk flexion bias, wraptor like ankle brace donned    Treatment:  Therapeutic Exercise  Ankle brace donned  // bars ~ 10 ft - -lateral walking x 2 laps   Seated LAQ bilateral 1#  3\" 2 x 10 ea   // bars~ 3 ft -  fwd walking with heel strike x 10 ea  Seated iso hip adduction 3\" x 15   // bars- -retro walking x 2 laps     4 yellow hurdles,step to pattern x 2 laps     Seated with brace doffed right foot  - arch 2x5 HEP  - toe toga x 10 ea HEP  -red marybel disc 2x5 -  Fwd  Retro  Inv  Ev    Manual   Long sit -supported    Scar massage, edema massage, gentle Gr I-II posterior talar glides with active ankle DF x 10 reps  OP education      Current HEP:  HEP to be completed daily, exercises include:  Ankle alphabet  Heel cord stretch  Towel scrunches  LAQ  Seated hip ABD mint    Has patient been compliant with HEP? Yes    Activity tolerance:  Good, slowly improving standing tolerance to cook (current tolerance at 5 minutes per pt)  OP EDUCATION:  Rationale of edema massage.     Assessment:  Patient tolerated treatment well. Patient appears to be working hard in clinic and motivated to decrease pain and restore all functional deficits. Verbal and/or tactile cues given for proper form, and avoidance of substitution patterns with all exercises. All infection control policies followed during this visit.  Pt presents with mild residual edema medial/dorsal aspect of R foot.   Pt's response to treatment: Fair   Limitations/deficits:  ROM limited and affects function and Difficult to walk due to pain.    Pain end of session: 4 of 10;  sore       Plan:  Continue with current POC with the following changes consider band resisted 4 way ankle    Assessment of current progress against goals:  Progressing toward functional goals    Goals:    STG (3 visits)   Patient to be independent with HEP     LTG (16 visits)   LEFS to < 35% impaired   Patient to ambulate with " least restrictive device community distances without pain   AROM right ankle = to left without pain   MMT right ankle  = to left

## 2025-02-19 DIAGNOSIS — G57.10 MERALGIA PARESTHETICA, UNSPECIFIED LATERALITY: ICD-10-CM

## 2025-02-19 RX ORDER — GABAPENTIN 300 MG/1
CAPSULE ORAL
Qty: 180 CAPSULE | Refills: 11 | Status: SHIPPED | OUTPATIENT
Start: 2025-02-19

## 2025-02-20 ENCOUNTER — TREATMENT (OUTPATIENT)
Dept: PHYSICAL THERAPY | Facility: CLINIC | Age: 70
End: 2025-02-20
Payer: MEDICARE

## 2025-02-20 PROCEDURE — 97110 THERAPEUTIC EXERCISES: CPT | Mod: CQ,GP | Performed by: SPECIALIST/TECHNOLOGIST

## 2025-02-20 PROCEDURE — 97140 MANUAL THERAPY 1/> REGIONS: CPT | Mod: CQ,GP | Performed by: SPECIALIST/TECHNOLOGIST

## 2025-02-25 NOTE — PROGRESS NOTES
Physical Therapy Treatment    Patient Name: Anahi Bellamy  MRN: [id number]  Encounter date:  2/26/2025  Time Calculation  Start Time: 1400  Stop Time: 1455  Time Calculation (min): 55 min     PT Therapeutic Procedures Time Entry  Therapeutic Exercise Time Entry: 47  Gait Training Time Entry: 8     Insurance Information:  Visit Number:  7 (including evaluation)  Planned total visits: 16  Visits Authorized/Insurance Coverage:  2025: MEDICARE A/B, ANTHEM 50V PT/OT/ST, NO AUTH, ( $0 USED PT/ST)     Current Problem  Problem List Items Addressed This Visit    None    Problem List Items Addressed This Visit    None  Visit Diagnoses           Codes     Chronic or recurrent subluxation of peroneal tendon of right foot     M24.471      Surgery:   DOS: 11/22/2024  S/P:   1. Right ankle distal tibia bone spur excision   2. Right ankle secondary ATFL ligament repair   3. Right ankle arthroscopy with extensive debridement   4. Right peroneal subluxation repair with peroneal retinaculum reimbrication   5. Right peroneal tendon tenolysis and debridement     11 weeks post op 2/13/25      Precautions:  CAD  HTN  Per Dr. Perez 2/4/25 Hi. Her surgery was 11/22/2024. So roughly 10 weeks out. I am OK with full weight-bearing with ankle brace. Would be OK with discontinuation of ankle brace at week 14 as she feels comfortable. No other set restrictions.     Relevant PMH:  Ankylosing spondylitis (HCC)   Aortic stenosis   AV block   Carotid artery disease (HCC)   Diverticulitis   Dysphagia   Fatty liver disease, nonalcoholic   GERD (gastroesophageal reflux disease)   Hearing loss   Hypothyroid   Neuropathy   Primary generalized (osteo)arthritis   Sleep apnea   Bilateral TKA  X2 TKA revisions  Cervical/lumbar fusions  Left ankle surgery   HTN  CAD  Right RC surgery    Pain  Heel and medial and arch 5/10   Right ankle     Subjective  General  Pt reports good tolerance to last session, experiencing mild DOMS of R ankle.  Pt reports  "recent follow up with [facility], pleased with progress thus far.  Pt now weaning from lace up ankle brace;  pt was issued arch support (powerstep pinnacle) put on Meloxicam and oral steroid secondary to medial ankle/arch discomfort. Reports she has started the Meloxicam but not the steroid.   Pt reports current R lateral ankle pain at a 1 of 10;  reports medial ankle pain rated at 3-4 of 10.     Objective  Pt arrives ambulating with rollator, no ankle brace donned, mildly flexed trunk posture.     Treatment:  Therapeutic Exercise  Ankle brace doffed:  -Nu step s= 10   A= 6   R=1  x 4'   -Seated LAQ bilateral 1.5#  2 x 10 ea   Seated iso hip adduction 5\" hold   2 x 10     4 yellow hurdles,step to pattern x 2 laps ; reciprocal pattern x 2 laps  -at counter by // bars: lateral stepping R and L x 2 laps  Seated with brace doffed right foot:  -1/2 foam roll  pf/df;  inv/ever  x 15 reps each  -seated BAPS board L2;  PF/DF x 15; Inv/EVER x 15;  cw/ccw x 10  -at wall rail, 2\" step, 3 x 5 reps  -moflex gastroc,  3 x 20\"  2nd ridge    Gait:  -pt measured for correct height standard cane;  ambulated 2 laps within // bars, L hand for cane/No R UE assist;     -pt then ambulated with standard cane and light CGA from clinician from // bars to Nu-step (approximately 50') with intermittent verbal cueing for upright trunk posture.  -ambulated from Nu-step to rollator x 20'   (pt left session ambulating with rollator)    Manual DNP  Long sit -supported    Scar massage, edema massage, gentle Gr I-II posterior talar glides with active ankle DF x 10 reps    Current HEP:  HEP to be completed daily, exercises include:  Ankle alphabet  Heel cord stretch  Towel scrunches  LAQ  Seated hip ABD mint    Has patient been compliant with HEP? Yes    Activity tolerance:  Improving standing and ckc exercise tolerance.   OP EDUCATION:  Updated clinical exercise this date.       Assessment:  Patient tolerated treatment well. Patient appears to be working " "hard in clinic and motivated to decrease pain and restore all functional deficits. Verbal and/or tactile cues given for proper form, and avoidance of substitution patterns with all exercises. All infection control policies followed during this visit. No observed antalgia with exercise performance.  Pt reports transient R ankle muscle fatigue after performance of seated BAPS.  Pt provided verbal cueing for correct mechanics with step up exercise I.e \"let your knee do the work, feel it straighten as you step up and feel it bend as you step back off\"    Pt's response to treatment: fatigued.  Improved tolerance to ckc exercise.     Limitations/deficits:  Weakness and Difficult to walk due to pain.    Pain end of session: 1 of 10;     Plan:  Continue with current POC with the following changes consider band resisted 4 way ankle when bands are available (not this date)    Assessment of current progress against goals:  Progressing toward functional goals    Goals:    STG (3 visits)   Patient to be independent with HEP     LTG (16 visits)   LEFS to < 35% impaired   Patient to ambulate with least restrictive device community distances without pain   AROM right ankle = to left without pain   MMT right ankle  = to left     "

## 2025-02-26 ENCOUNTER — TREATMENT (OUTPATIENT)
Dept: PHYSICAL THERAPY | Facility: CLINIC | Age: 70
End: 2025-02-26
Payer: COMMERCIAL

## 2025-02-26 PROCEDURE — 97110 THERAPEUTIC EXERCISES: CPT | Mod: GP,CQ

## 2025-02-26 PROCEDURE — 97116 GAIT TRAINING THERAPY: CPT | Mod: GP,CQ

## 2025-02-26 NOTE — PROGRESS NOTES
Physical Therapy Treatment    Patient Name: Anahi Bellamy  MRN: [id number]  Encounter date:  2/28/2025  Time Calculation  Start Time: 1415  Stop Time: 1500  Time Calculation (min): 45 min     PT Therapeutic Procedures Time Entry  Therapeutic Exercise Time Entry: 42  Gait Training Time Entry: 3     Insurance Information:  Visit Number:  8 (including evaluation)  Planned total visits: 16  Visits Authorized/Insurance Coverage:  2025: MEDICARE A/B, ANTHEM 50V PT/OT/ST, NO AUTH, ( $0 USED PT/ST)     Current Problem  Problem List Items Addressed This Visit    None  Visit Diagnoses         Codes    Chronic or recurrent subluxation of peroneal tendon of right foot    -  Primary M24.471    Relevant Orders    Follow Up In Physical Therapy          Problem List Items Addressed This Visit    None  Visit Diagnoses           Codes     Chronic or recurrent subluxation of peroneal tendon of right foot     M24.471      Surgery:   DOS: 11/22/2024  S/P:   1. Right ankle distal tibia bone spur excision   2. Right ankle secondary ATFL ligament repair   3. Right ankle arthroscopy with extensive debridement   4. Right peroneal subluxation repair with peroneal retinaculum reimbrication   5. Right peroneal tendon tenolysis and debridement     11 weeks post op 2/13/25      Precautions:  CAD  HTN  Per Dr. Perez 2/4/25 Hi. Her surgery was 11/22/2024. So roughly 10 weeks out. I am OK with full weight-bearing with ankle brace. Would be OK with discontinuation of ankle brace at week 14 as she feels comfortable. No other set restrictions.     Relevant PMH:  Ankylosing spondylitis (HCC)   Aortic stenosis   AV block   Carotid artery disease (HCC)   Diverticulitis   Dysphagia   Fatty liver disease, nonalcoholic   GERD (gastroesophageal reflux disease)   Hearing loss   Hypothyroid   Neuropathy   Primary generalized (osteo)arthritis   Sleep apnea   Bilateral TKA  X2 TKA revisions  Cervical/lumbar fusions  Left ankle surgery   HTN  CAD  Right RC  "surgery    Pain  Heel and medial and arch 5/10   Right ankle     Subjective  General  Pt reports good tolerance to last session, experiencing mild soreness of R ankle.  Pt reports current R  lateral ankle pain rated at 2 of 10; inner pain rated at 3 of 10.  Pt reports she has weaned from lace up ankle brace, has not worn since Monday.  Pt reports she has also been using Powerstep [place] shoe insert this week (as directed by [facility]).  Pt reports she has not yet started oral steroid,  plans on beginning next Monday).       Objective  Pt arrives ambulating with rollator, no ankle brace donned, mildly flexed trunk posture and subtle decreased R ankle DF at heel strike    Treatment:  Therapeutic Exercise  Ankle brace doffed:  -Nu step s= 10   A= 6   R=1  x 6'   -Seated LAQ bilateral 1.5#  2 x 10 ea   4 yellow hurdles,step to pattern x 2 laps ; reciprocal pattern x 2 laps  -in bars,  lateral stepping R and L x 2 laps  Seated with brace doffed right foot:  -1/2 foam roll  pf/df;  inv/ever  x 15 reps each  -seated BAPS board L2;  PF/DF x 15; Inv/EVER x 15;  cw/ccw x 10  -at wall rail, 2\" step, x 10 reps 4\" x 10   -moflex gastroc,  3 x 20\"  2nd ridge    Gait:    -pt ambulated with standard cane and light CGA (approximately 100', including turns) with intermittent verbal cueing for upright trunk posture.      Manual DNP  Long sit -supported    Scar massage, edema massage, gentle Gr I-II posterior talar glides with active ankle DF x 10 reps    Current HEP:  HEP to be completed daily, exercises include:  Ankle alphabet  Heel cord stretch  Towel scrunches  LAQ  Seated hip ABD mint    Has patient been compliant with HEP? Yes    Activity tolerance:  Improving standing and ckc exercise tolerance.   OP EDUCATION:  Updated clinical exercise this date.       Assessment:  Patient tolerated treatment well. Patient appears to be working hard in clinic and motivated to decrease pain and restore all functional deficits. Verbal and/or " tactile cues given for proper form, and avoidance of substitution patterns with all exercises. All infection control policies followed during this visit. No observed antalgia (ankle) with exercise performance, with pt demonstrating improving ROM and dynamic stability with performance of seated BAPS board.  Pt's subjective soreness not unsurprising given amount of progression this week (wean from brace; improved therapy output; progression to powerstep pinnacle arch support).  Pt reminded of potential benefits of daily icing for pain control    Pt's response to treatment: fatigued.  Improved tolerance to ckc exercise.     Limitations/deficits:  Weakness and Difficult to walk due to pain.    Pain end of session: 1-2 of 10;     Plan:  Continue with current POC with the following changes consider band resisted 4 way ankle when bands are available (not this date)    Assessment of current progress against goals:  Progressing toward functional goals    Goals:    STG (3 visits)   Patient to be independent with HEP     LTG (16 visits)   LEFS to < 35% impaired   Patient to ambulate with least restrictive device community distances without pain   AROM right ankle = to left without pain   MMT right ankle  = to left

## 2025-02-28 ENCOUNTER — TREATMENT (OUTPATIENT)
Dept: PHYSICAL THERAPY | Facility: CLINIC | Age: 70
End: 2025-02-28
Payer: COMMERCIAL

## 2025-02-28 DIAGNOSIS — M24.471 CHRONIC OR RECURRENT SUBLUXATION OF PERONEAL TENDON OF RIGHT FOOT: Primary | ICD-10-CM

## 2025-02-28 PROCEDURE — 97110 THERAPEUTIC EXERCISES: CPT | Mod: GP,CQ

## 2025-02-28 NOTE — PROGRESS NOTES
Physical Therapy Treatment    Patient Name: Anahi Bellamy  MRN: [id number]  Encounter date:  3/3/2025  Time Calculation  Start Time: 1420  Stop Time: 1505  Time Calculation (min): 45 min     PT Therapeutic Procedures Time Entry  Therapeutic Exercise Time Entry: 40       Visit Number:  9 (including evaluation)  Planned total visits: 16  Visits Authorized/Insurance Coverage:  2025: MEDICARE A/B, ANTHEM 50V PT/OT/ST, NO AUTH, ( $0 USED PT/ST)        Current Problem  Problem List Items Addressed This Visit    None  Visit Diagnoses         Codes    Chronic or recurrent subluxation of peroneal tendon of right foot    -  Primary M24.471            Surgery:   DOS: 11/22/2024  S/P:   1. Right ankle distal tibia bone spur excision   2. Right ankle secondary ATFL ligament repair   3. Right ankle arthroscopy with extensive debridement   4. Right peroneal subluxation repair with peroneal retinaculum reimbrication   5. Right peroneal tendon tenolysis and debridement      15 weeks post op 2/27/25      Precautions:  CAD  HTN  Per Dr. Perez 2/4/25 Hi. Her surgery was 11/22/2024. So roughly 10 weeks out. I am OK with full weight-bearing with ankle brace. Would be OK with discontinuation of ankle brace at week 14 as she feels comfortable. No other set restrictions.     Relevant PMH:  Ankylosing spondylitis (HCC)   Aortic stenosis   AV block   Carotid artery disease (HCC)   Diverticulitis   Dysphagia   Fatty liver disease, nonalcoholic   GERD (gastroesophageal reflux disease)   Hearing loss   Hypothyroid   Neuropathy   Primary generalized (osteo)arthritis   Sleep apnea   Bilateral TKA  X2 TKA revisions  Cervical/lumbar fusions  Left ankle surgery   HTN  CAD  Right RC surgery          Pain  Right ankle- global  2/10       Subjective  General  Patient     Objective  Using cane for ambulation in department; good swing through pattern    Treatment:  Therapeutic Exercise  -Nu step s= 10   A= 6   R=1  x 6'  -Seated LAQ bilateral 1.5#  " 2 x 10 ea   -Seated iso hip adduction 5\" hold   2 x 10   - Mint TB hip ABD 2 x 10     4 yellow hurdles,step to pattern x 3/2 laps FWD/LAT    Seated with brace doffed right foot:  -1/2 foam roll  pf/df;  inv/ever  x 15 reps each- DNP  - seatedheel raises x 15  -seated BAPS board L2;  PF/DF x 15; Inv/EVER x 15;  cw/ccw x 10  -at wall rail, 4\" step, x 10 reps  -moflex gastroc,  3 x 20\"  2nd ridge       Manual DNP  Long sit -supported    Scar massage, edema massage, gentle Gr I-II posterior talar glides with active ankle DF x 10 reps     Current HEP:  HEP to be completed daily, exercises include:  Ankle alphabet  Heel cord stretch  Towel scrunches  LAQ  Seated hip ABD mint       Has patient been compliant with HEP? Yes    Activity tolerance:  good    OP EDUCATION:  HEP       Assessment:  Pt's response to treatment:  good  Areas of improvements:  ROM/function  Limitations/deficits:  balance/endurance    Pain end of session:   Up to 2/10; no gross changes    Plan:  Continue with current POC with the following changes advance WB activityas able    Assessment of current progress against goals:  Progressing toward functional goals    Goals:     STG (3 visits)   Patient to be independent with HEP     LTG (16 visits)   LEFS to < 35% impaired   Patient to ambulate with least restrictive device community distances without pain   AROM right ankle = to left without pain   MMT right ankle  = to left     "

## 2025-03-03 ENCOUNTER — TREATMENT (OUTPATIENT)
Dept: PHYSICAL THERAPY | Facility: CLINIC | Age: 70
End: 2025-03-03
Payer: COMMERCIAL

## 2025-03-03 DIAGNOSIS — M24.471 CHRONIC OR RECURRENT SUBLUXATION OF PERONEAL TENDON OF RIGHT FOOT: Primary | ICD-10-CM

## 2025-03-03 PROCEDURE — 97110 THERAPEUTIC EXERCISES: CPT | Mod: GP

## 2025-03-04 NOTE — PROGRESS NOTES
"    Physical Therapy Treatment    Patient Name: Anahi Bellamy  MRN: [id number]  Encounter date:  3/5/2025  Time Calculation  Start Time: 1417  Stop Time: 1500  Time Calculation (min): 43 min     PT Therapeutic Procedures Time Entry  Therapeutic Exercise Time Entry: 40       Visit Number:  10 (including evaluation)  Planned total visits: 16  Visits Authorized/Insurance Coverage:  2025: MEDICARE A/B, ANTHEM 50V PT/OT/ST, NO AUTH, ( $0 USED PT/ST)      Current Problem  Problem List Items Addressed This Visit    None  Visit Diagnoses         Codes    Chronic or recurrent subluxation of peroneal tendon of right foot     M24.471               Surgery:   DOS: 11/22/2024  S/P:   1. Right ankle distal tibia bone spur excision   2. Right ankle secondary ATFL ligament repair   3. Right ankle arthroscopy with extensive debridement   4. Right peroneal subluxation repair with peroneal retinaculum reimbrication   5. Right peroneal tendon tenolysis and debridement      15 weeks post op 2/27/25      Precautions:  CAD  HTN  Per Dr. Perez 2/4/25 Hi. Her surgery was 11/22/2024. So roughly 10 weeks out. I am OK with full weight-bearing with ankle brace. Would be OK with discontinuation of ankle brace at week 14 as she feels comfortable. No other set restrictions.     Relevant PMH:  Ankylosing spondylitis (HCC)   Aortic stenosis   AV block   Carotid artery disease (HCC)   Diverticulitis   Dysphagia   Fatty liver disease, nonalcoholic   GERD (gastroesophageal reflux disease)   Hearing loss   Hypothyroid   Neuropathy   Primary generalized (osteo)arthritis   Sleep apnea   Bilateral TKA  X2 TKA revisions  Cervical/lumbar fusions  Left ankle surgery   HTN  CAD  Right RC surgery       Pain  1/10    Subjective  General  Patient in good spirits.  Patient reports less time to recover from session; \"it's usually still sore at this point\".    Objective  Improving tolerance of walking with cane; used through " "deparment      Treatment:  Therapeutic Exercise  -Nu step s= 10   A= 6   R=2  x 6'  -Seated LAQ bilateral 2#  2 x 10 ea   -Seated iso hip adduction 5\" hold   2 x 10   - Mint TB hip ABD 2 x 10  -moflex gastroc,  3 x 20\"  2nd ridge     4 yellow hurdles,step to pattern x 3/2 laps FWD/LAT      Step ups  -at wall rail, 4\" step, x 10 reps- DNP          Seated with brace doffed right foot:  -1/2 foam roll  pf/df;  inv/ever  x 15 reps each- DNP  - seated heel raises x 20  -seated BAPS board L2;  PF/DF x 20; Inv/EVER x 20;  cw/ccw x 10       Manual DNP  Long sit -supported    Scar massage, edema massage, gentle Gr I-II posterior talar glides with active ankle DF x 10 reps     Current HEP:  HEP to be completed daily, exercises include:  Ankle alphabet  Heel cord stretch  Towel scrunches  LAQ  Seated hip ABD mint       Has patient been compliant with HEP? Yes    Activity tolerance:  good    OP EDUCATION:  HEP    Assessment:  Pt's response to treatment:  good  Areas of improvements:  pain/function  Limitations/deficits:  endurance    Pain end of session:   1/10    Plan:  Continue with current POC with the following changes advance as able    Assessment of current progress against goals:  Progressing toward functional goals        Goals:    STG (3 visits)   Patient to be independent with HEP     LTG (16 visits)   LEFS to < 35% impaired   Patient to ambulate with least restrictive device community distances without pain   AROM right ankle = to left without pain   MMT right ankle  = to left     "

## 2025-03-05 ENCOUNTER — TREATMENT (OUTPATIENT)
Dept: PHYSICAL THERAPY | Facility: CLINIC | Age: 70
End: 2025-03-05
Payer: COMMERCIAL

## 2025-03-05 DIAGNOSIS — M24.471 CHRONIC OR RECURRENT SUBLUXATION OF PERONEAL TENDON OF RIGHT FOOT: ICD-10-CM

## 2025-03-05 PROCEDURE — 97110 THERAPEUTIC EXERCISES: CPT | Mod: GP

## 2025-03-10 ENCOUNTER — APPOINTMENT (OUTPATIENT)
Dept: SLEEP MEDICINE | Facility: CLINIC | Age: 70
End: 2025-03-10
Payer: COMMERCIAL

## 2025-03-11 ENCOUNTER — TREATMENT (OUTPATIENT)
Dept: PHYSICAL THERAPY | Facility: CLINIC | Age: 70
End: 2025-03-11
Payer: COMMERCIAL

## 2025-03-11 DIAGNOSIS — M24.471 CHRONIC OR RECURRENT SUBLUXATION OF PERONEAL TENDON OF RIGHT FOOT: ICD-10-CM

## 2025-03-11 PROCEDURE — 97110 THERAPEUTIC EXERCISES: CPT | Mod: GP

## 2025-03-11 NOTE — PROGRESS NOTES
"    Physical Therapy Treatment    Patient Name: Anahi Bellamy  MRN: [id number]  Encounter date:  3/11/2025  Time Calculation  Start Time: 1425  Stop Time: 1505  Time Calculation (min): 40 min     PT Therapeutic Procedures Time Entry  Therapeutic Exercise Time Entry: 40       Visit Number:  11 (including evaluation)  Planned total visits: 16  Visits Authorized/Insurance Coverage:  2025: MEDICARE A/B, ANTHEM 50V PT/OT/ST, NO AUTH, ( $0 USED PT/ST)     Current Problem  Problem List Items Addressed This Visit    None  Visit Diagnoses         Codes    Chronic or recurrent subluxation of peroneal tendon of right foot     M24.471               Surgery:   DOS: 11/22/2024  S/P:   1. Right ankle distal tibia bone spur excision   2. Right ankle secondary ATFL ligament repair   3. Right ankle arthroscopy with extensive debridement   4. Right peroneal subluxation repair with peroneal retinaculum reimbrication   5. Right peroneal tendon tenolysis and debridement      15 weeks post op 3/7/25       Pain  No gross pain to report    Subjective  General  Patient in good spirits and motivated to participate.    Objective  No gross swelling appreciated  Gait stability improving      Treatment:  Therapeutic Exercise  -Nu step s= 10   A= 6   R=2  x 7'  -Seated LAQ bilateral 2#  2 x 10 ea   -Seated iso hip adduction 5\" hold   2 x 10   - Mint TB hip ABD 2 x 10  -moflex gastroc,  3 x 20\"  2nd ridge     4 yellow hurdles,step to pattern x 3/2 laps FWD/LAT       Step ups  -4\" step, x 10 reps     Seated with brace doffed right foot:  -1/2 foam roll  pf/df;  inv/ever  x 15 reps each- DNP  - seated heel raises x 20  -seated BAPS board L2;  PF/DF x 20; Inv/EVER x 20;  cw/ccw x 10        Current HEP:  HEP to be completed daily, exercises include:  Ankle alphabet  Heel cord stretch  Towel scrunches  LAQ  Seated hip ABD mint       Has patient been compliant with HEP? No    Activity tolerance:  good    OP EDUCATION:  HEP    Assessment:  Pt's response " to treatment:  good  Areas of improvements:  pain/function  Limitations/deficits:  good    Pain end of session:   No specific increase    Plan:  Continue with current POC with the following changes advance as able    Assessment of current progress against goals:  Progressing toward functional goals       Goals:  STG (3 visits)   Patient to be independent with HEP     LTG (16 visits)   LEFS to < 35% impaired   Patient to ambulate with least restrictive device community distances without pain   AROM right ankle = to left without pain   MMT right ankle  = to left

## 2025-03-12 NOTE — PROGRESS NOTES
"    Physical Therapy Treatment    Patient Name: Anahi Bellamy  MRN: [id number]  Encounter date:  3/13/2025  Time Calculation  Start Time: 1415  Stop Time: 1500  Time Calculation (min): 45 min     PT Therapeutic Procedures Time Entry  Therapeutic Exercise Time Entry: 40       Visit Number:  12 (including evaluation)  Planned total visits: 16  Visits Authorized/Insurance Coverage:  2025: MEDICARE A/B, ANTHEM 50V PT/OT/ST, NO AUTH, ( $0 USED PT/ST)        Current Problem  Problem List Items Addressed This Visit    None  Visit Diagnoses         Codes    Chronic or recurrent subluxation of peroneal tendon of right foot    -  Primary M24.471            Surgery:   DOS: 11/22/2024  S/P:   1. Right ankle distal tibia bone spur excision   2. Right ankle secondary ATFL ligament repair   3. Right ankle arthroscopy with extensive debridement   4. Right peroneal subluxation repair with peroneal retinaculum reimbrication   5. Right peroneal tendon tenolysis and debridement      15 weeks post op 3/7/25         Precautions:  CAD  HTN     Relevant PMH:  Ankylosing spondylitis (HCC)   Aortic stenosis   AV block   Carotid artery disease (HCC)   Diverticulitis   Dysphagia   Fatty liver disease, nonalcoholic   GERD (gastroesophageal reflux disease)   Hearing loss   Hypothyroid   Neuropathy   Primary generalized (osteo)arthritis   Sleep apnea   Bilateral TKA  X2 TKA revisions  Cervical/lumbar fusions  Left ankle surgery   HTN  CAD  Right RC surgery       Pain  0/10    Subjective  General  Patient reports \"no sharp pain at all today\".  Patient reports modest LBP secondary to position.  Patient reports \"I'm back to most of my activities\".    Objective  Gait continues to normalize with rollator  Ankle ROM remains limited but pain free      Treatment:  Therapeutic Exercise  -Nu step s= 10   A= 6   R=2  x 9'  -Seated LAQ bilateral 3#  2 x 10 ea   -Seated iso hip adduction 5\" hold   2 x 10   - Mint TB hip ABD 2 x 10  -moflex gastroc,  3 x 20\"  " "2nd ridge     4 yellow hurdles,step to pattern x 3/2 laps FWD/LAT- cramping right HS        Step ups  -4\" step, x 10 reps     Seated with brace doffed right foot:  -1/2 foam roll  pf/df;  inv/ever  x 15 reps each- DNP  - seated heel raises x 20  -seated BAPS board L2;  PF/DF x 20; Inv/EVER x 20;  cw/ccw x 10        Current HEP:  HEP to be completed daily, exercises include:  Ankle alphabet  Heel cord stretch  Towel scrunches  LAQ  Seated hip ABD mint       Has patient been compliant with HEP? Yes    Activity tolerance:  good    OP EDUCATION:  HEP    Assessment:  Pt's response to treatment:  good  Areas of improvements:  function  Limitations/deficits:  endurance    Pain end of session:   0/10    Plan:  Continue with current POC with the following changes advance as able    Assessment of current progress against goals:  Progressing toward functional goals         Goals:  STG (3 visits)   Patient to be independent with HEP     LTG (16 visits)   LEFS to < 35% impaired   Patient to ambulate with least restrictive device community distances without pain   AROM right ankle = to left without pain   MMT right ankle  = to left        "

## 2025-03-13 ENCOUNTER — TREATMENT (OUTPATIENT)
Dept: PHYSICAL THERAPY | Facility: CLINIC | Age: 70
End: 2025-03-13
Payer: COMMERCIAL

## 2025-03-13 DIAGNOSIS — M24.471 CHRONIC OR RECURRENT SUBLUXATION OF PERONEAL TENDON OF RIGHT FOOT: Primary | ICD-10-CM

## 2025-03-13 PROCEDURE — 97110 THERAPEUTIC EXERCISES: CPT | Mod: GP

## 2025-03-17 NOTE — PROGRESS NOTES
"    Physical Therapy Treatment    Patient Name: Anahi Bellamy  MRN: [id number]  Encounter date:  3/18/2025  Time Calculation  Start Time: 0800  Stop Time: 0846  Time Calculation (min): 46 min     PT Therapeutic Procedures Time Entry  Therapeutic Exercise Time Entry: 46       Visit Number:  13 (including evaluation)  Planned total visits: 16  Visits Authorized/Insurance Coverage:  2025: MEDICARE A/B, ANTHEM 50V PT/OT/ST, NO AUTH, ( $0 USED PT/ST)        Current Problem  Problem List Items Addressed This Visit    None  Visit Diagnoses         Codes    Chronic or recurrent subluxation of peroneal tendon of right foot     M24.471              Surgery:   DOS: 11/22/2024  S/P:   1. Right ankle distal tibia bone spur excision   2. Right ankle secondary ATFL ligament repair   3. Right ankle arthroscopy with extensive debridement   4. Right peroneal subluxation repair with peroneal retinaculum reimbrication   5. Right peroneal tendon tenolysis and debridement      15 weeks post op 3/7/25         Precautions:  CAD  HTN     Relevant PMH:  Ankylosing spondylitis (HCC)   Aortic stenosis   AV block   Carotid artery disease (HCC)   Diverticulitis   Dysphagia   Fatty liver disease, nonalcoholic   GERD (gastroesophageal reflux disease)   Hearing loss   Hypothyroid   Neuropathy   Primary generalized (osteo)arthritis   Sleep apnea   Bilateral TKA  X2 TKA revisions  Cervical/lumbar fusions  Left ankle surgery   HTN  CAD  Right RC surgery       Pain  0/10    Subjective  General  Pt reports good tolerance to last session, \"was down to zero pain\"; pt reports having a very busy week-end, was on feet for about 12 hours and was noticeably sore.   Pt reports current R ankle pain rated at 1 of 10.      Objective  Gait continues to normalize with rollator, with pt demonstrating more upright trunk posture  and slightly increased stride length         Treatment:  Therapeutic Exercise  -Nu step s= 10   A= 6   R=2  x 9'  -moflex gastroc,  3 x 20\"  " "2nd ridge    -Seated LAQ bilateral 3#  2 x 10 ea   -Seated iso hip adduction 5\" hold   2 x 10   - Mint TB hip ABD 2 x 10       4 yellow hurdles,step to pattern, reciprocal, and lateral x 2 laps         Step ups  -4\" step, 2 x 10 reps  L and R      Seated with brace doffed right foot:  -1/2 foam roll  pf/df;  inv/ever  x 20 reps each   - seated heel raises x 20  -seated BAPS board L3;  PF/DF x 20; Inv/EVER x 20;  cw/ccw x 10        Current HEP:  HEP to be completed daily, exercises include:  Ankle alphabet  Heel cord stretch  Towel scrunches  LAQ  Seated hip ABD mint       Has patient been compliant with HEP? Yes    Activity tolerance:  Improving per pt's subjective    OP EDUCATION:  HEP    Assessment:  Pt's response to treatment:  good; fatigue.   Areas of improvements:  tolerance to ckc progression. ; progressed to L3 on wobble board  Limitations/deficits:  not yet back to reciprocal stair ambulation on 8\" steps.     Pain end of session:   0/10    Plan:  Continue with current POC with the following changes advance as able    Assessment of current progress against goals:  Progressing toward functional goals         Goals:  STG (3 visits)   Patient to be independent with HEP     LTG (16 visits)   LEFS to < 35% impaired   Patient to ambulate with least restrictive device community distances without pain   AROM right ankle = to left without pain   MMT right ankle  = to left        " - - -

## 2025-03-18 ENCOUNTER — TREATMENT (OUTPATIENT)
Dept: PHYSICAL THERAPY | Facility: CLINIC | Age: 70
End: 2025-03-18
Payer: COMMERCIAL

## 2025-03-18 DIAGNOSIS — M24.471 CHRONIC OR RECURRENT SUBLUXATION OF PERONEAL TENDON OF RIGHT FOOT: ICD-10-CM

## 2025-03-18 PROCEDURE — 97110 THERAPEUTIC EXERCISES: CPT | Mod: GP,CQ

## 2025-03-25 ENCOUNTER — TREATMENT (OUTPATIENT)
Dept: PHYSICAL THERAPY | Facility: CLINIC | Age: 70
End: 2025-03-25
Payer: COMMERCIAL

## 2025-03-25 DIAGNOSIS — M24.471 CHRONIC OR RECURRENT SUBLUXATION OF PERONEAL TENDON OF RIGHT FOOT: ICD-10-CM

## 2025-03-25 PROCEDURE — 97110 THERAPEUTIC EXERCISES: CPT | Mod: GP,CQ

## 2025-03-25 PROCEDURE — 97112 NEUROMUSCULAR REEDUCATION: CPT | Mod: GP,CQ

## 2025-03-25 NOTE — PROGRESS NOTES
Physical Therapy Treatment    Patient Name: Anahi Bellamy  MRN: [id number]  Encounter date:  3/25/2025  Time Calculation  Start Time: 0930  Stop Time: 1015  Time Calculation (min): 45 min     PT Therapeutic Procedures Time Entry  Neuromuscular Re-Education Time Entry: 22  Therapeutic Exercise Time Entry: 23       Visit Number:  14 (including evaluation)  Planned total visits: 16  Visits Authorized/Insurance Coverage:  2025: MEDICARE A/B, ANTHEM 50V PT/OT/ST, NO AUTH, ( $0 USED PT/ST)        Current Problem  Problem List Items Addressed This Visit    None  Visit Diagnoses         Codes    Chronic or recurrent subluxation of peroneal tendon of right foot     M24.471                Surgery:   DOS: 11/22/2024  S/P:   1. Right ankle distal tibia bone spur excision   2. Right ankle secondary ATFL ligament repair   3. Right ankle arthroscopy with extensive debridement   4. Right peroneal subluxation repair with peroneal retinaculum reimbrication   5. Right peroneal tendon tenolysis and debridement      15 weeks post op 3/7/25         Precautions:  CAD  HTN     Relevant PMH:  Ankylosing spondylitis (HCC)   Aortic stenosis   AV block   Carotid artery disease (HCC)   Diverticulitis   Dysphagia   Fatty liver disease, nonalcoholic   GERD (gastroesophageal reflux disease)   Hearing loss   Hypothyroid   Neuropathy   Primary generalized (osteo)arthritis   Sleep apnea   Bilateral TKA  X2 TKA revisions  Cervical/lumbar fusions  Left ankle surgery   HTN  CAD  Right RC surgery       Pain  0/10    Subjective  General  Pt reports good tolerance to last session; pt reports compliance with HEP and continues to experience some overall progression with standing and FA tolerance vs 6 weeks ago.  Pt denies any ankle pain at present;  pt reports she went down her stairs reciprocally this morning, first time since surgery. Pt reports she still requires intermittent seated rest breaks t/o the day to rest the R ankle.       Objective  Gait  "continues to normalize with rollator, with pt demonstrating more upright trunk posture  and slightly increased stride length. Pt presents with non hinged R ankle compressive sleeve donned.         Treatment:  Therapeutic Exercise  -Nu step s= 10   A= 6   R=2  x 9'  -moflex gastroc,  3 x 20\"  2nd ridge  Step ups  -4\" step, 1 x 10 reps  L and R ;  6\"  1 x 10 R and L      Neuromuscular re-education  -tandem stance 2 x 30\" R and L   -4 yellow hurdles,step to pattern, reciprocal, and lateral x 2 laps   -rocker board (in // bars) x 10 A/P  then static balance x 30\"  -NBOS on blue Airex pad 2 x 30\"    Current HEP:  HEP to be completed daily, exercises include:  Ankle alphabet  Heel cord stretch  Towel scrunches  LAQ  Seated hip ABD mint       Has patient been compliant with HEP? Yes    Activity tolerance:  Improving standing and walking tolerance vs 6 weeks ago per pt.     OP EDUCATION:  Rationale of abdominal bracing during balance exercise.     Assessment:  Pt's response to treatment:  good; fatigue.   Areas of improvements: initiation and progression of ckc balance/proprioceptive exercises  Limitations/deficits:  slightly altered balance, residual weakness     Pain end of session:   0/10    Plan:  Continue with current POC with the following changes advance as able    Assessment of current progress against goals:  Progressing toward functional goals         Goals:  STG (3 visits)   Patient to be independent with HEP     LTG (16 visits)   LEFS to < 35% impaired   Patient to ambulate with least restrictive device community distances without pain   AROM right ankle = to left without pain   MMT right ankle  = to left        "

## 2025-03-28 NOTE — PROGRESS NOTES
"    Physical Therapy Treatment    Patient Name: Anahi Bellamy  MRN: [id number]  Encounter date:  3/31/2025  Time Calculation  Start Time: 1020  Stop Time: 1105  Time Calculation (min): 45 min       Visit Number:  15 (including evaluation)  Planned total visits: 16  Visits Authorized/Insurance Coverage:  2025: MEDICARE A/B, ANTHEM 50V PT/OT/ST, NO AUTH, ( $0 USED PT/ST)        Current Problem  Problem List Items Addressed This Visit    None  Visit Diagnoses         Codes    Chronic or recurrent subluxation of peroneal tendon of right foot     M24.471               Surgery:   DOS: 11/22/2024  S/P:   1. Right ankle distal tibia bone spur excision   2. Right ankle secondary ATFL ligament repair   3. Right ankle arthroscopy with extensive debridement   4. Right peroneal subluxation repair with peroneal retinaculum reimbrication   5. Right peroneal tendon tenolysis and debridement      15 weeks post op 3/7/25          Precautions:  CAD  HTN     Relevant PMH:  Ankylosing spondylitis (HCC)   Aortic stenosis   AV block   Carotid artery disease (HCC)   Diverticulitis   Dysphagia   Fatty liver disease, nonalcoholic   GERD (gastroesophageal reflux disease)   Hearing loss   Hypothyroid   Neuropathy   Primary generalized (osteo)arthritis   Sleep apnea   Bilateral TKA  X2 TKA revisions  Cervical/lumbar fusions  Left ankle surgery   HTN  CAD  Right RC surgery     Pain  0/10 today  Up to 8/10 over weekend    Subjective  General  Patient reports variable  levels of pain/swelling; manage with brace/compression. Patient reports \"pretty much back to most things\".  Patient attending [employer] activities without issues.    Objective  Gait smooth and fluid with rollator    Treatment:  Therapeutic Exercise  -Nu step s= 10   A= 6   R=2  x 9'  -moflex gastroc,  3 x 20\"  2nd ridge  Step ups  -6\"  1 x 10 R and L   -Seated iso hip adduction 5\" hold   2 x 10   - Mint TB hip ABD 2 x 10    Neuromuscular " "re-education  -tandem stance 2 x 30\" R and L   -4 yellow hurdles,step to pattern, reciprocal, and lateral x 2 laps   -rocker board (in // bars) x 10 A/P  then static balance x 30\"  -NBOS on blue Airex pad 2 x 30\"     Current HEP:  HEP to be completed daily, exercises include:  Ankle alphabet  Heel cord stretch  Towel scrunches  LAQ  Seated hip ABD mint     Has patient been compliant with HEP? Yes    Activity tolerance:    OP EDUCATION:  HEP    Assessment:  Pt's response to treatment:  good  Areas of improvements:  endurance/function  Limitations/deficits:  pain/extensive walking/WB    Pain end of session:   Transient with WB exercise    Plan:  Continue with current POC with the following changes discharge    Assessment of current progress against goals:  Progressing toward functional goals      Goals:  STG (3 visits)   Patient to be independent with HEP     LTG (16 visits)   LEFS to < 35% impaired   Patient to ambulate with least restrictive device community distances without pain   AROM right ankle = to left without pain   MMT right ankle  = to left  "

## 2025-03-31 ENCOUNTER — TREATMENT (OUTPATIENT)
Dept: PHYSICAL THERAPY | Facility: CLINIC | Age: 70
End: 2025-03-31
Payer: MEDICARE

## 2025-03-31 DIAGNOSIS — M24.471 CHRONIC OR RECURRENT SUBLUXATION OF PERONEAL TENDON OF RIGHT FOOT: ICD-10-CM

## 2025-03-31 PROCEDURE — 97110 THERAPEUTIC EXERCISES: CPT | Mod: GP

## 2025-03-31 PROCEDURE — 97112 NEUROMUSCULAR REEDUCATION: CPT | Mod: GP

## 2025-04-01 NOTE — PROGRESS NOTES
"    Physical Therapy Treatment    Patient Name: Anahi Bellamy  MRN: [id number]  Encounter date:  4/2/2025  Time Calculation  Start Time: 1420  Stop Time: 1505  Time Calculation (min): 45 min     PT Therapeutic Procedures Time Entry  Neuromuscular Re-Education Time Entry: 18  Therapeutic Exercise Time Entry: 20       Visit Number:  16 (including evaluation)  Planned total visits: 16  Visits Authorized/Insurance Coverage:  2025: MEDICARE A/B, ANTHEM 50V PT/OT/ST, NO AUTH, ( $0 USED PT/ST)     Current Problem  Problem List Items Addressed This Visit    None  Visit Diagnoses         Codes    Chronic or recurrent subluxation of peroneal tendon of right foot    -  Primary M24.471               Surgery:   DOS: 11/22/2024  S/P:   1. Right ankle distal tibia bone spur excision   2. Right ankle secondary ATFL ligament repair   3. Right ankle arthroscopy with extensive debridement   4. Right peroneal subluxation repair with peroneal retinaculum reimbrication   5. Right peroneal tendon tenolysis and debridement      15 weeks post op 3/7/25     Precautions:  CAD  HTN     Relevant PMH:  Ankylosing spondylitis (HCC)   Aortic stenosis   AV block   Carotid artery disease (HCC)   Diverticulitis   Dysphagia   Fatty liver disease, nonalcoholic   GERD (gastroesophageal reflux disease)   Hearing loss   Hypothyroid   Neuropathy   Primary generalized (osteo)arthritis   Sleep apnea   Bilateral TKA  X2 TKA revisions  Cervical/lumbar fusions  Left ankle surgery   HTN  CAD  Right RC surgery         Pain  Presently  0/10    Up to   6/10    Subjective  General  Patient reports variable levels of pain toward end of day.  Patient reference medial ankle/mid foot/arch.  Patient reports symptoms resolve with rest.  Patient reports \"I sometimes think it's the brace.  Patient to DPM 4/7.  Patient denies instability.    Objective  LEFS  MMT  AROM  +tenderness PTT  LEFS  37/80      Treatment:  Therapeutic Exercise  -Nu step s= 10   A= 6   R=2  x " "9'  -moflex gastroc,  3 x 20\"  2nd ridge  Step ups  -6\"  1 x 10 R and L   -Seated iso hip adduction 5\" hold   2 x 10   - Mint TB hip ABD 2 x 10     Neuromuscular re-education  -tandem stance 2 x 30\" R and L   -4 yellow hurdles,step to pattern, reciprocal, and lateral x 2 laps - DNP  -rocker board (in // bars) x 10 A/P  then static balance x 30\"-DNP  -NBOS on blue Airex pad 2 x 30\"     Current HEP:  HEP to be completed daily, exercises include:  Ankle alphabet  Heel cord stretch  Towel scrunches  LAQ  Seated hip ABD mint       Has patient been compliant with HEP? Yes    Activity tolerance:  good    OP EDUCATION:  HEP  Pace activity    Assessment:  Patient with symptoms consistent with PTT tendonitis  Pt's response to treatment:  good  Areas of improvements:  function  Limitations/deficits:  variable levels of pain    Pain end of session:   No changes     Plan:  Discharge    Assessment of current progress against goals:  Functionally independent, goals met:  Date met 4/2 and Progressing toward functional goals      Goals:  STG (3 visits)   Patient to be independent with HEP     LTG (16 visits)   LEFS to < 35% impaired- MOT MET   Patient to ambulate with least restrictive device community distances without pain- MET   AROM right ankle = to left without pain- WFL MET   MMT right ankle  = to left- MET  "

## 2025-04-02 ENCOUNTER — TREATMENT (OUTPATIENT)
Dept: PHYSICAL THERAPY | Facility: CLINIC | Age: 70
End: 2025-04-02
Payer: MEDICARE

## 2025-04-02 DIAGNOSIS — M24.471 CHRONIC OR RECURRENT SUBLUXATION OF PERONEAL TENDON OF RIGHT FOOT: Primary | ICD-10-CM

## 2025-04-02 PROCEDURE — 97112 NEUROMUSCULAR REEDUCATION: CPT | Mod: GP

## 2025-04-02 PROCEDURE — 97110 THERAPEUTIC EXERCISES: CPT | Mod: GP

## 2025-04-21 ENCOUNTER — APPOINTMENT (OUTPATIENT)
Dept: CARDIOLOGY | Facility: CLINIC | Age: 70
End: 2025-04-21
Payer: MEDICARE

## 2025-05-07 ENCOUNTER — APPOINTMENT (OUTPATIENT)
Dept: CARDIOLOGY | Facility: CLINIC | Age: 70
End: 2025-05-07
Payer: COMMERCIAL

## 2025-05-12 ENCOUNTER — APPOINTMENT (OUTPATIENT)
Age: 70
End: 2025-05-12
Payer: COMMERCIAL

## 2025-05-12 VITALS
DIASTOLIC BLOOD PRESSURE: 84 MMHG | BODY MASS INDEX: 48.82 KG/M2 | HEART RATE: 75 BPM | WEIGHT: 293 LBS | SYSTOLIC BLOOD PRESSURE: 166 MMHG | TEMPERATURE: 98.6 F | HEIGHT: 65 IN | RESPIRATION RATE: 18 BRPM | OXYGEN SATURATION: 96 %

## 2025-05-12 DIAGNOSIS — I35.0 NONRHEUMATIC AORTIC VALVE STENOSIS: ICD-10-CM

## 2025-05-12 DIAGNOSIS — I10 PRIMARY HYPERTENSION: ICD-10-CM

## 2025-05-12 DIAGNOSIS — I10 BENIGN ESSENTIAL HYPERTENSION: Primary | ICD-10-CM

## 2025-05-12 PROCEDURE — 1159F MED LIST DOCD IN RCRD: CPT | Performed by: INTERNAL MEDICINE

## 2025-05-12 PROCEDURE — 3008F BODY MASS INDEX DOCD: CPT | Performed by: INTERNAL MEDICINE

## 2025-05-12 PROCEDURE — 3077F SYST BP >= 140 MM HG: CPT | Performed by: INTERNAL MEDICINE

## 2025-05-12 PROCEDURE — 1126F AMNT PAIN NOTED NONE PRSNT: CPT | Performed by: INTERNAL MEDICINE

## 2025-05-12 PROCEDURE — 3079F DIAST BP 80-89 MM HG: CPT | Performed by: INTERNAL MEDICINE

## 2025-05-12 PROCEDURE — 99213 OFFICE O/P EST LOW 20 MIN: CPT | Performed by: INTERNAL MEDICINE

## 2025-05-12 PROCEDURE — 1036F TOBACCO NON-USER: CPT | Performed by: INTERNAL MEDICINE

## 2025-05-12 RX ORDER — LISINOPRIL AND HYDROCHLOROTHIAZIDE 20; 25 MG/1; MG/1
1 TABLET ORAL DAILY
Qty: 90 TABLET | Refills: 3 | Status: SHIPPED | OUTPATIENT
Start: 2025-05-12

## 2025-05-12 RX ORDER — EFINACONAZOLE 100 MG/ML
SOLUTION TOPICAL
COMMUNITY
Start: 2025-03-31

## 2025-05-12 RX ORDER — LISINOPRIL 20 MG/1
20 TABLET ORAL DAILY
COMMUNITY
End: 2025-05-12 | Stop reason: SDUPTHER

## 2025-05-12 RX ORDER — LISINOPRIL 20 MG/1
20 TABLET ORAL DAILY
Qty: 90 TABLET | Refills: 3 | Status: SHIPPED | OUTPATIENT
Start: 2025-05-12

## 2025-05-12 RX ORDER — CLOBETASOL PROPIONATE 0.5 MG/G
OINTMENT TOPICAL
COMMUNITY
Start: 2025-04-02

## 2025-05-12 ASSESSMENT — LIFESTYLE VARIABLES
HAS A RELATIVE, FRIEND, DOCTOR, OR ANOTHER HEALTH PROFESSIONAL EXPRESSED CONCERN ABOUT YOUR DRINKING OR SUGGESTED YOU CUT DOWN: NO
AUDIT TOTAL SCORE: 1
HOW OFTEN DO YOU HAVE A DRINK CONTAINING ALCOHOL: MONTHLY OR LESS
HAVE YOU OR SOMEONE ELSE BEEN INJURED AS A RESULT OF YOUR DRINKING: NO
HOW MANY STANDARD DRINKS CONTAINING ALCOHOL DO YOU HAVE ON A TYPICAL DAY: 1 OR 2
SKIP TO QUESTIONS 9-10: 1
HOW OFTEN DO YOU HAVE SIX OR MORE DRINKS ON ONE OCCASION: NEVER
AUDIT-C TOTAL SCORE: 1

## 2025-05-12 ASSESSMENT — ENCOUNTER SYMPTOMS
WEIGHT LOSS: 0
DIZZINESS: 0
SYNCOPE: 0
FEVER: 0
DYSPNEA ON EXERTION: 0
OCCASIONAL FEELINGS OF UNSTEADINESS: 1
NEAR-SYNCOPE: 0
IRREGULAR HEARTBEAT: 0
MYALGIAS: 0
WEIGHT GAIN: 0
WHEEZING: 0
DEPRESSION: 0
PALPITATIONS: 0
PND: 0
WEAKNESS: 0
LOSS OF SENSATION IN FEET: 0
CLAUDICATION: 0
ORTHOPNEA: 0
COUGH: 0
DIAPHORESIS: 0
SHORTNESS OF BREATH: 0

## 2025-05-12 ASSESSMENT — PAIN SCALES - GENERAL: PAINLEVEL_OUTOF10: 0-NO PAIN

## 2025-05-12 ASSESSMENT — PATIENT HEALTH QUESTIONNAIRE - PHQ9
2. FEELING DOWN, DEPRESSED OR HOPELESS: NOT AT ALL
1. LITTLE INTEREST OR PLEASURE IN DOING THINGS: NOT AT ALL
SUM OF ALL RESPONSES TO PHQ9 QUESTIONS 1 AND 2: 0

## 2025-05-12 NOTE — ASSESSMENT & PLAN NOTE
Will increase lisinopril from 20-40. BMP in 2 weeks. Continue to monitor at home 2-3x/week. RTC 3m

## 2025-05-12 NOTE — PROGRESS NOTES
Subjective      Chief Complaint   Patient presents with    Follow-up     Anahi Bellamy presents to the office today for a 6 month follow up.           70-year-old female with history of hypertension and hyperlipidemia presents for cardiac evaluation.  She was a patient of Dr. Byrd.  She was catheterized in  in response to a positive stress test to find normal coronary arteries. She has a history of aortic stenosis and on echocardiogram in 2024 it was mild with peak velocity across the valve of 276 cm/s.  She had ankle sgy and is still rehabing from this. Sgy was thanksgiving         Review of Systems   Constitutional: Negative for diaphoresis, fever, weight gain and weight loss.   Eyes:  Negative for visual disturbance.   Cardiovascular:  Negative for chest pain, claudication, dyspnea on exertion, irregular heartbeat, leg swelling, near-syncope, orthopnea, palpitations, paroxysmal nocturnal dyspnea and syncope.   Respiratory:  Negative for cough, shortness of breath and wheezing.    Musculoskeletal:  Negative for muscle weakness and myalgias.   Neurological:  Negative for dizziness and weakness.   All other systems reviewed and are negative.       Medical History[1]     Surgical History[2]     Social History     Socioeconomic History    Marital status:      Spouse name: Not on file    Number of children: Not on file    Years of education: Not on file    Highest education level: Not on file   Occupational History    Not on file   Tobacco Use    Smoking status: Former     Current packs/day: 0.00     Average packs/day: 1 pack/day for 24.0 years (24.0 ttl pk-yrs)     Types: Cigarettes     Start date:      Quit date:      Years since quittin.3     Passive exposure: Past    Smokeless tobacco: Never   Vaping Use    Vaping status: Never Used   Substance and Sexual Activity    Alcohol use: Yes     Comment: occ    Drug use: Never    Sexual activity: Not on file   Other Topics Concern     Not on file   Social History Narrative    Not on file     Social Drivers of Health     Financial Resource Strain: Not on file   Food Insecurity: Not on file   Transportation Needs: Not on file   Physical Activity: Not on file   Stress: Not on file   Social Connections: Not on file   Intimate Partner Violence: Not on file   Housing Stability: Not on file        Family History[3]     OBJECTIVE:    Vitals:    05/12/25 1356   BP: 166/84   Pulse: 75   Resp: 18   Temp: 37 °C (98.6 °F)   SpO2: 96%        Vitals reviewed.   Constitutional:       Appearance: Normal and healthy appearance. Not in distress.   Pulmonary:      Effort: Pulmonary effort is normal.      Breath sounds: Normal breath sounds.   Cardiovascular:      Normal rate. Regular rhythm. Normal S1. Normal S2.       Murmurs: There is a harsh midsystolic murmur at the URSB, radiating to the neck.      No gallop.  No click.   Pulses:     Intact distal pulses.   Edema:     Peripheral edema absent.   Skin:     General: Skin is warm and dry.   Neurological:      General: No focal deficit present.          Lab Review:   Lab Results   Component Value Date    CHOL 154 08/07/2024    TRIG 137 08/07/2024    HDL 49.0 (L) 08/07/2024       Lab Results   Component Value Date    LDLCALC 78 08/07/2024        Nonrheumatic aortic valve stenosis  Mild progressive stage B. Will repeat echo 4/2026.    Benign essential hypertension  Will increase lisinopril from 20-40. BMP in 2 weeks. Continue to monitor at home 2-3x/week. RTC 3m            [1]   Past Medical History:  Diagnosis Date    Anemia     Not for seversl years    Ankylosing spondylitis     Arrhythmia     Arthritis     AS (aortic stenosis)     Chronic pain disorder     Coronary artery disease     Diverticulosis     Mild    Dysfunctional uterine bleeding     Dysphagia     Fibroid     Hysterectomy    Fibromyalgia, primary     Fractures     GERD (gastroesophageal reflux disease)     Hearing aid worn     History of blood  transfusion     After back surgery    HL (hearing loss)     Hyperlipidemia     Hypertension     Hypothyroidism     Irregular heart beat     Joint pain     Obesity     Peripheral neuropathy     Pneumonia     Several times. Many years  ago.    Scoliosis     Sleep apnea     Spinal stenosis     Urinary tract infection     Not for several years    Vision loss    [2]   Past Surgical History:  Procedure Laterality Date    ANKLE ARTHROPLASTY      ANKLE FRACTURE SURGERY      Avulsion fracture, tendon & ligament repair    ANKLE SURGERY Left     BLADDER SUSPENSION      CARDIAC CATHETERIZATION      Normal coronary arteries    CARPAL TUNNEL RELEASE      Both hands.    CERVICAL FUSION      CERVICAL LAMINECTOMY       SECTION, LOW TRANSVERSE  ,80,82,84    COLONOSCOPY      DILATION AND CURETTAGE OF UTERUS      ESOPHAGOGASTRODUODENOSCOPY      FOOT FRACTURE SURGERY      HYSTERECTOMY      JOINT REPLACEMENT      KNEE ARTHROPLASTY      4 replacements    LUMBAR FUSION      LUMBAR LAMINECTOMY      MENISCECTOMY      OTHER SURGICAL HISTORY      ROTATOR CUFF REPAIR      SPINAL FUSION      TONSILLECTOMY  1979    TOTAL KNEE ARTHROPLASTY Right 2015    TOTAL KNEE ARTHROPLASTY Left 2014    TOTAL KNEE ARTHROPLASTY Right     Revision X 2    TUBAL LIGATION  10/18/84    UPPER GASTROINTESTINAL ENDOSCOPY     [3]   Family History  Problem Relation Name Age of Onset    Heart disease Mother Skyler Alford     Hypertension Mother Skyler Alford     Hearing loss Mother Skyler Alford     Hyperlipidemia Mother Skyler Alford     Heart disease Father Noah Alford     Hypertension Father Noah Alfrod     Stroke Father Noah Alford     COPD Father Noah Alford     Hearing loss Father Noah Alford     Hyperlipidemia Father Noah Alford     Other (bladder cancer) Brother Braeden Alford     Hyperlipidemia Brother Braeden Alford     Anesthesia problems Other Christine Gwen (daughter)     Arthritis Other Christine Gwen  (daughter)     Hypertension Other Christine Hidalgo (daughter)     Anesthesia problems Other Priscila Pohto (daughter)     Hypertension Other Priscila Pohto (daughter)     Cancer Brother Garrett Alford     Heart disease Other Concepcion Landis (daughter)     Hypertension Other Concepcion Landis (daughter)     Miscarriages / Stillbirths Other Concepcion Landis (daughter)     Hypertension Other Hellen Bellamy  (daughter)

## 2025-05-26 DIAGNOSIS — I10 PRIMARY HYPERTENSION: ICD-10-CM

## 2025-06-03 LAB
25(OH)D3+25(OH)D2 SERPL-MCNC: 37 NG/ML (ref 30–100)
ALBUMIN SERPL-MCNC: 4.6 G/DL (ref 3.6–5.1)
ALBUMIN/GLOB SERPL: 1.9 (CALC) (ref 1–2.5)
ALP SERPL-CCNC: 79 U/L (ref 37–153)
ALT SERPL-CCNC: 35 U/L (ref 6–29)
AST SERPL-CCNC: 31 U/L (ref 10–35)
BASOPHILS # BLD AUTO: 39 CELLS/UL (ref 0–200)
BASOPHILS NFR BLD AUTO: 0.6 %
BILIRUB SERPL-MCNC: 0.6 MG/DL (ref 0.2–1.2)
BUN SERPL-MCNC: 23 MG/DL (ref 7–25)
BUN/CREAT SERPL: ABNORMAL (CALC) (ref 6–22)
CALCIUM SERPL-MCNC: 10.2 MG/DL (ref 8.6–10.4)
CHLORIDE SERPL-SCNC: 102 MMOL/L (ref 98–110)
CO2 SERPL-SCNC: 30 MMOL/L (ref 20–32)
CREAT SERPL-MCNC: 0.67 MG/DL (ref 0.6–1)
CRP SERPL-MCNC: <3 MG/L
EGFRCR SERPLBLD CKD-EPI 2021: 94 ML/MIN/1.73M2
EOSINOPHIL # BLD AUTO: 98 CELLS/UL (ref 15–500)
EOSINOPHIL NFR BLD AUTO: 1.5 %
ERYTHROCYTE [DISTWIDTH] IN BLOOD BY AUTOMATED COUNT: 13.3 % (ref 11–15)
ERYTHROCYTE [SEDIMENTATION RATE] IN BLOOD BY WESTERGREN METHOD: 2 MM/H
FERRITIN SERPL-MCNC: 62 NG/ML (ref 16–288)
GLOBULIN SER CALC-MCNC: 2.4 G/DL (CALC) (ref 1.9–3.7)
GLUCOSE SERPL-MCNC: 104 MG/DL (ref 65–99)
HCT VFR BLD AUTO: 38.5 % (ref 35–45)
HGB BLD-MCNC: 12.6 G/DL (ref 11.7–15.5)
IRON SATN MFR SERPL: 24 % (CALC) (ref 16–45)
IRON SERPL-MCNC: 91 MCG/DL (ref 45–160)
LYMPHOCYTES # BLD AUTO: 2431 CELLS/UL (ref 850–3900)
LYMPHOCYTES NFR BLD AUTO: 37.4 %
MCH RBC QN AUTO: 32.1 PG (ref 27–33)
MCHC RBC AUTO-ENTMCNC: 32.7 G/DL (ref 32–36)
MCV RBC AUTO: 98 FL (ref 80–100)
MONOCYTES # BLD AUTO: 670 CELLS/UL (ref 200–950)
MONOCYTES NFR BLD AUTO: 10.3 %
NEUTROPHILS # BLD AUTO: 3263 CELLS/UL (ref 1500–7800)
NEUTROPHILS NFR BLD AUTO: 50.2 %
PLATELET # BLD AUTO: 210 THOUSAND/UL (ref 140–400)
PMV BLD REES-ECKER: 9.5 FL (ref 7.5–12.5)
POTASSIUM SERPL-SCNC: 4.2 MMOL/L (ref 3.5–5.3)
PROT SERPL-MCNC: 7 G/DL (ref 6.1–8.1)
RBC # BLD AUTO: 3.93 MILLION/UL (ref 3.8–5.1)
SODIUM SERPL-SCNC: 140 MMOL/L (ref 135–146)
TIBC SERPL-MCNC: 373 MCG/DL (CALC) (ref 250–450)
VIT B12 SERPL-MCNC: 1403 PG/ML (ref 200–1100)
WBC # BLD AUTO: 6.5 THOUSAND/UL (ref 3.8–10.8)

## 2025-06-25 LAB
ANION GAP SERPL CALCULATED.4IONS-SCNC: 10 MMOL/L (CALC) (ref 7–17)
BUN SERPL-MCNC: 20 MG/DL (ref 7–25)
BUN/CREAT SERPL: NORMAL (CALC) (ref 6–22)
CALCIUM SERPL-MCNC: 10.3 MG/DL (ref 8.6–10.4)
CHLORIDE SERPL-SCNC: 100 MMOL/L (ref 98–110)
CO2 SERPL-SCNC: 28 MMOL/L (ref 20–32)
CREAT SERPL-MCNC: 0.7 MG/DL (ref 0.6–1)
EGFRCR SERPLBLD CKD-EPI 2021: 93 ML/MIN/1.73M2
GLUCOSE SERPL-MCNC: 91 MG/DL (ref 65–99)
POTASSIUM SERPL-SCNC: 4.2 MMOL/L (ref 3.5–5.3)
SODIUM SERPL-SCNC: 138 MMOL/L (ref 135–146)

## 2025-08-12 ENCOUNTER — OFFICE VISIT (OUTPATIENT)
Dept: PRIMARY CARE | Facility: CLINIC | Age: 70
End: 2025-08-12
Payer: MEDICARE

## 2025-08-12 VITALS
DIASTOLIC BLOOD PRESSURE: 76 MMHG | OXYGEN SATURATION: 97 % | WEIGHT: 291 LBS | HEIGHT: 65 IN | TEMPERATURE: 97.4 F | HEART RATE: 66 BPM | SYSTOLIC BLOOD PRESSURE: 130 MMHG | BODY MASS INDEX: 48.48 KG/M2

## 2025-08-12 DIAGNOSIS — Z00.00 ENCOUNTER FOR MEDICARE ANNUAL WELLNESS EXAM: Primary | ICD-10-CM

## 2025-08-12 DIAGNOSIS — Z78.0 MENOPAUSE: ICD-10-CM

## 2025-08-12 DIAGNOSIS — E78.2 MIXED HYPERLIPIDEMIA: ICD-10-CM

## 2025-08-12 DIAGNOSIS — E03.9 ACQUIRED HYPOTHYROIDISM: ICD-10-CM

## 2025-08-12 DIAGNOSIS — M45.9 ANKYLOSING SPONDYLITIS OF UNSPECIFIED SITES IN SPINE (MULTI): ICD-10-CM

## 2025-08-12 DIAGNOSIS — G47.33 OSA ON CPAP: ICD-10-CM

## 2025-08-12 DIAGNOSIS — Z13.820 ENCOUNTER FOR SCREENING FOR OSTEOPOROSIS: ICD-10-CM

## 2025-08-12 DIAGNOSIS — I10 BENIGN ESSENTIAL HYPERTENSION: ICD-10-CM

## 2025-08-12 DIAGNOSIS — K21.9 GASTROESOPHAGEAL REFLUX DISEASE WITHOUT ESOPHAGITIS: ICD-10-CM

## 2025-08-12 DIAGNOSIS — Z12.31 ENCOUNTER FOR SCREENING MAMMOGRAM FOR BREAST CANCER: ICD-10-CM

## 2025-08-12 PROCEDURE — 3078F DIAST BP <80 MM HG: CPT | Performed by: NURSE PRACTITIONER

## 2025-08-12 PROCEDURE — 1125F AMNT PAIN NOTED PAIN PRSNT: CPT | Performed by: NURSE PRACTITIONER

## 2025-08-12 PROCEDURE — 3008F BODY MASS INDEX DOCD: CPT | Performed by: NURSE PRACTITIONER

## 2025-08-12 PROCEDURE — 99214 OFFICE O/P EST MOD 30 MIN: CPT | Performed by: NURSE PRACTITIONER

## 2025-08-12 PROCEDURE — G0439 PPPS, SUBSEQ VISIT: HCPCS | Performed by: NURSE PRACTITIONER

## 2025-08-12 PROCEDURE — 3075F SYST BP GE 130 - 139MM HG: CPT | Performed by: NURSE PRACTITIONER

## 2025-08-12 PROCEDURE — 1158F ADVNC CARE PLAN TLK DOCD: CPT | Performed by: NURSE PRACTITIONER

## 2025-08-12 PROCEDURE — 1159F MED LIST DOCD IN RCRD: CPT | Performed by: NURSE PRACTITIONER

## 2025-08-12 PROCEDURE — 1160F RVW MEDS BY RX/DR IN RCRD: CPT | Performed by: NURSE PRACTITIONER

## 2025-08-12 PROCEDURE — 99215 OFFICE O/P EST HI 40 MIN: CPT | Performed by: NURSE PRACTITIONER

## 2025-08-12 ASSESSMENT — ENCOUNTER SYMPTOMS
FEVER: 0
POLYDIPSIA: 0
VOMITING: 0
WOUND: 0
DIZZINESS: 0
DIAPHORESIS: 0
DEPRESSION: 0
NECK PAIN: 0
DYSURIA: 0
POLYPHAGIA: 0
AGITATION: 0
BLOOD IN STOOL: 0
LOSS OF SENSATION IN FEET: 1
SEIZURES: 0
BRUISES/BLEEDS EASILY: 0
SHORTNESS OF BREATH: 0
CONFUSION: 0
CHILLS: 0
BACK PAIN: 0
HEMATURIA: 0
ADENOPATHY: 0
ABDOMINAL PAIN: 0
FATIGUE: 0
NAUSEA: 0
PALPITATIONS: 0
CHEST TIGHTNESS: 0
SPEECH DIFFICULTY: 0
HEADACHES: 0
OCCASIONAL FEELINGS OF UNSTEADINESS: 1
FACIAL ASYMMETRY: 0
FLANK PAIN: 0
COUGH: 0

## 2025-08-12 ASSESSMENT — LIFESTYLE VARIABLES
HOW OFTEN DURING THE LAST YEAR HAVE YOU HAD A FEELING OF GUILT OR REMORSE AFTER DRINKING: NEVER
HOW OFTEN DURING THE LAST YEAR HAVE YOU FAILED TO DO WHAT WAS NORMALLY EXPECTED FROM YOU BECAUSE OF DRINKING: NEVER
HAVE YOU OR SOMEONE ELSE BEEN INJURED AS A RESULT OF YOUR DRINKING: NO
HOW OFTEN DURING THE LAST YEAR HAVE YOU BEEN UNABLE TO REMEMBER WHAT HAPPENED THE NIGHT BEFORE BECAUSE YOU HAD BEEN DRINKING: NEVER
HAS A RELATIVE, FRIEND, DOCTOR, OR ANOTHER HEALTH PROFESSIONAL EXPRESSED CONCERN ABOUT YOUR DRINKING OR SUGGESTED YOU CUT DOWN: NO
HOW OFTEN DO YOU HAVE SIX OR MORE DRINKS ON ONE OCCASION: NEVER
HOW OFTEN DURING THE LAST YEAR HAVE YOU NEEDED AN ALCOHOLIC DRINK FIRST THING IN THE MORNING TO GET YOURSELF GOING AFTER A NIGHT OF HEAVY DRINKING: NEVER
AUDIT-C TOTAL SCORE: 0
HOW OFTEN DO YOU HAVE A DRINK CONTAINING ALCOHOL: NEVER
SKIP TO QUESTIONS 9-10: 1
HOW OFTEN DURING THE LAST YEAR HAVE YOU FOUND THAT YOU WERE NOT ABLE TO STOP DRINKING ONCE YOU HAD STARTED: NEVER
AUDIT TOTAL SCORE: 0
HOW MANY STANDARD DRINKS CONTAINING ALCOHOL DO YOU HAVE ON A TYPICAL DAY: PATIENT DOES NOT DRINK

## 2025-08-12 ASSESSMENT — PATIENT HEALTH QUESTIONNAIRE - PHQ9
1. LITTLE INTEREST OR PLEASURE IN DOING THINGS: NOT AT ALL
2. FEELING DOWN, DEPRESSED OR HOPELESS: NOT AT ALL
SUM OF ALL RESPONSES TO PHQ9 QUESTIONS 1 AND 2: 0

## 2025-08-12 ASSESSMENT — PAIN SCALES - GENERAL: PAINLEVEL_OUTOF10: 4

## 2025-08-27 ENCOUNTER — APPOINTMENT (OUTPATIENT)
Age: 70
End: 2025-08-27
Payer: COMMERCIAL

## 2025-08-27 VITALS
SYSTOLIC BLOOD PRESSURE: 136 MMHG | WEIGHT: 291 LBS | HEIGHT: 65 IN | BODY MASS INDEX: 48.48 KG/M2 | RESPIRATION RATE: 16 BRPM | OXYGEN SATURATION: 96 % | DIASTOLIC BLOOD PRESSURE: 72 MMHG | HEART RATE: 75 BPM

## 2025-08-27 DIAGNOSIS — I35.0 NONRHEUMATIC AORTIC VALVE STENOSIS: Primary | ICD-10-CM

## 2025-08-27 DIAGNOSIS — I10 BENIGN ESSENTIAL HYPERTENSION: ICD-10-CM

## 2025-08-27 PROCEDURE — 1159F MED LIST DOCD IN RCRD: CPT | Performed by: INTERNAL MEDICINE

## 2025-08-27 PROCEDURE — 1036F TOBACCO NON-USER: CPT | Performed by: INTERNAL MEDICINE

## 2025-08-27 PROCEDURE — 3008F BODY MASS INDEX DOCD: CPT | Performed by: INTERNAL MEDICINE

## 2025-08-27 PROCEDURE — 3075F SYST BP GE 130 - 139MM HG: CPT | Performed by: INTERNAL MEDICINE

## 2025-08-27 PROCEDURE — 99213 OFFICE O/P EST LOW 20 MIN: CPT | Performed by: INTERNAL MEDICINE

## 2025-08-27 PROCEDURE — 3078F DIAST BP <80 MM HG: CPT | Performed by: INTERNAL MEDICINE

## 2025-08-27 PROCEDURE — 1126F AMNT PAIN NOTED NONE PRSNT: CPT | Performed by: INTERNAL MEDICINE

## 2025-08-27 ASSESSMENT — PAIN SCALES - GENERAL: PAINLEVEL_OUTOF10: 0-NO PAIN

## 2025-08-27 ASSESSMENT — LIFESTYLE VARIABLES
AUDIT-C TOTAL SCORE: 0
HOW OFTEN DO YOU HAVE A DRINK CONTAINING ALCOHOL: NEVER
HAS A RELATIVE, FRIEND, DOCTOR, OR ANOTHER HEALTH PROFESSIONAL EXPRESSED CONCERN ABOUT YOUR DRINKING OR SUGGESTED YOU CUT DOWN: NO
HOW MANY STANDARD DRINKS CONTAINING ALCOHOL DO YOU HAVE ON A TYPICAL DAY: PATIENT DOES NOT DRINK
SKIP TO QUESTIONS 9-10: 1
AUDIT TOTAL SCORE: 0
HOW OFTEN DO YOU HAVE SIX OR MORE DRINKS ON ONE OCCASION: NEVER
HAVE YOU OR SOMEONE ELSE BEEN INJURED AS A RESULT OF YOUR DRINKING: NO

## 2025-08-27 ASSESSMENT — ENCOUNTER SYMPTOMS
OCCASIONAL FEELINGS OF UNSTEADINESS: 0
CLAUDICATION: 0
PALPITATIONS: 0
SYNCOPE: 0
DYSPNEA ON EXERTION: 0
LOSS OF SENSATION IN FEET: 0
PND: 0
WEAKNESS: 0
MYALGIAS: 0
IRREGULAR HEARTBEAT: 0
WHEEZING: 0
FEVER: 0
WEIGHT LOSS: 0
DIZZINESS: 0
COUGH: 0
SHORTNESS OF BREATH: 0
DIAPHORESIS: 0
WEIGHT GAIN: 0
DEPRESSION: 0
NEAR-SYNCOPE: 0
ORTHOPNEA: 0

## 2026-01-26 ENCOUNTER — APPOINTMENT (OUTPATIENT)
Dept: SLEEP MEDICINE | Facility: CLINIC | Age: 71
End: 2026-01-26
Payer: MEDICARE

## 2026-02-16 ENCOUNTER — APPOINTMENT (OUTPATIENT)
Age: 71
End: 2026-02-16
Payer: COMMERCIAL

## (undated) DEVICE — TUBING, SUCTION, 6MM X 10, CLEAN N-COND

## (undated) DEVICE — BLADE, DISSECTOR, 3.5MM X 13CM

## (undated) DEVICE — Device

## (undated) DEVICE — GLOVE, SURGICAL, PROTEXIS PI ORTHO, 8.0, PF, LF

## (undated) DEVICE — APPLICATOR, CHLORAPREP, W/ORANGE TINT, 26ML

## (undated) DEVICE — STRIP, SKIN CLOSURE, STERI-STRIP, REINFORCED, 0.25 X 4 IN

## (undated) DEVICE — GLOVE, SURGICAL, PROTEXIS PI , 8.5, PF, LF

## (undated) DEVICE — BANDAGE, COMPRESSION, KNITTED, ELASTIC, SNGL VELCRO, 3IN, WHITE

## (undated) DEVICE — SUTURE, ETHILON, 3-0, 18 IN, PS1, BLACK

## (undated) DEVICE — BLADE, SURGICAL, POLYMER COATED P15, STERILE, DISP

## (undated) DEVICE — SLEEVE, VASO PRESS, CALF GARMENT, MEDIUM, GREEN

## (undated) DEVICE — NEEDLE, SPINAL, 20 G X 2.5 IN, YELLOW HUB

## (undated) DEVICE — SOLUTION, IRRIGATION, X RX SODIUM CHL 0.9%, 1000ML BTL

## (undated) DEVICE — SYRINGE, 60 CC, LUER LOCK, MONOJECT

## (undated) DEVICE — ADHESIVE, SKIN, MASTISOL, 2/3 CC VIAL

## (undated) DEVICE — SUTURE, VICRYL, 4-0, 70CM, TAPER SH

## (undated) DEVICE — TUBING, PUMP MAIN 16FT STERILE

## (undated) DEVICE — PADDING, CAST, SOFTROLL, 4 IN X 4 YD, STERILE

## (undated) DEVICE — PADDING, CAST, SPECIALIST, 4 IN X 4 YD, STERILE

## (undated) DEVICE — DRAPE, UNDERBUTTOCKS, W/ SUCTION PORT

## (undated) DEVICE — GLOVE, SURGICAL, PROTEXIS PI , 8.0, PF, LF

## (undated) DEVICE — SUTURE, VICRYL, 3-0, 27 IN, SH

## (undated) DEVICE — SUTURE, VICRYL, 3-0, 27 IN, SH, VIOLET

## (undated) DEVICE — DRESSING, NON-ADHERENT, 3 X 3 IN, STERILE